# Patient Record
Sex: MALE | Race: WHITE | NOT HISPANIC OR LATINO
[De-identification: names, ages, dates, MRNs, and addresses within clinical notes are randomized per-mention and may not be internally consistent; named-entity substitution may affect disease eponyms.]

---

## 2020-05-07 ENCOUNTER — APPOINTMENT (OUTPATIENT)
Dept: FAMILY MEDICINE | Facility: CLINIC | Age: 40
End: 2020-05-07
Payer: MEDICAID

## 2020-05-07 DIAGNOSIS — F10.10 ALCOHOL ABUSE, UNCOMPLICATED: ICD-10-CM

## 2020-05-07 DIAGNOSIS — F19.11 OTHER PSYCHOACTIVE SUBSTANCE ABUSE, IN REMISSION: ICD-10-CM

## 2020-05-07 DIAGNOSIS — F10.21 ALCOHOL DEPENDENCE, IN REMISSION: ICD-10-CM

## 2020-05-07 DIAGNOSIS — Z02.82 ENCOUNTER FOR ADOPTION SERVICES: ICD-10-CM

## 2020-05-07 DIAGNOSIS — U07.1 COVID-19: ICD-10-CM

## 2020-05-07 DIAGNOSIS — M54.9 DORSALGIA, UNSPECIFIED: ICD-10-CM

## 2020-05-07 DIAGNOSIS — M54.12 RADICULOPATHY, CERVICAL REGION: ICD-10-CM

## 2020-05-07 PROCEDURE — 99204 OFFICE O/P NEW MOD 45 MIN: CPT | Mod: 95

## 2020-05-07 RX ORDER — TRAMADOL HYDROCHLORIDE 50 MG/1
50 TABLET, COATED ORAL
Qty: 10 | Refills: 0 | Status: ACTIVE | COMMUNITY
Start: 2020-05-07

## 2020-05-07 RX ORDER — CYCLOBENZAPRINE HYDROCHLORIDE 5 MG/1
5 TABLET, FILM COATED ORAL
Qty: 15 | Refills: 0 | Status: ACTIVE | COMMUNITY
Start: 2020-05-07

## 2020-05-07 RX ORDER — METHYLPHENIDATE HYDROCHLORIDE 5 MG/1
5 TABLET ORAL
Qty: 10 | Refills: 0 | Status: ACTIVE | COMMUNITY
Start: 2020-05-07

## 2020-05-07 RX ORDER — PROPRANOLOL HYDROCHLORIDE 10 MG/1
10 TABLET ORAL
Refills: 0 | Status: ACTIVE | COMMUNITY

## 2020-05-07 NOTE — PLAN
[FreeTextEntry1] : back pain- was in PT (injury to C4), not interested in surgery, referred to PMR \par \par heart palpitations- referred to cardio \par \par COVID- still intermittent SOB, will send albuterol inhaler prn (pt. to call back with pharmacy info) \par \par anxiety- referred to psych, was never diagnosed formerly, aware his fam hx if unknown but may involve mental health issues \par \par fu in 2 months for labs, sooner if needed\par \par \par \par

## 2020-05-07 NOTE — REVIEW OF SYSTEMS
[Shortness Of Breath] : shortness of breath [Dyspnea on Exertion] : dyspnea on exertion [Anxiety] : anxiety [Negative] : Heme/Lymph

## 2020-05-07 NOTE — HEALTH RISK ASSESSMENT
[1 or 2 (0 pts)] : 1 or 2 (0 points) [Never (0 pts)] : Never (0 points) [No] : In the past 12 months have you used drugs other than those required for medical reasons? No [No falls in past year] : Patient reported no falls in the past year [0] : 2) Feeling down, depressed, or hopeless: Not at all (0) [] : No [Audit-CScore] : 0 [de-identified] : hx of abuse  [de-identified] : running, weight lifting [[id number]] : 0

## 2020-05-07 NOTE — HISTORY OF PRESENT ILLNESS
[Other Location: e.g. Home (Enter Location, City,State)___] : at [unfilled] [Home] : at home, [unfilled] , at the time of the visit. [Self] : self [Patient] : the patient [FreeTextEntry8] : 40 yo m presents to discuss his COVID diagnosis, 7 weeks ago. \par Mentioned he has not had a primary for a while. Had difficulty finding one. \par Had COVID 19 in March-- was not hospitalzed. Affected lungs-- had sob, chest pressure, blurred vision, had trouble speaking. Lingering symptoms-- sob intermittently, trouble taking deep breath intermittently. Was seen at [facility]. \par Hx of anxiety made his symptoms worse. 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 \par Home remidies-- steam, tea, otc meds. \par Much better since he started. Starting to exercise again. Hour of heavy lifting and sprinting 1 mile for 10 min prior to this, starting to slowly start again. \par Loss 18 pounds over the past 7 weeks.

## 2020-05-19 ENCOUNTER — APPOINTMENT (OUTPATIENT)
Dept: HEART AND VASCULAR | Facility: CLINIC | Age: 40
End: 2020-05-19
Payer: MEDICAID

## 2020-05-19 PROCEDURE — 99204 OFFICE O/P NEW MOD 45 MIN: CPT | Mod: 95

## 2020-05-20 NOTE — DISCUSSION/SUMMARY
[FreeTextEntry1] : Palpitations will schedule for a seven day holter/ event monitor provided his insurance company feels that it is a reasonable course of action and deems appropriate to approve. Will schedule for echo to evaluate LV function and structure\par Dizziness unspecified, again, echo and holter will be helpful, will bring in for labs as well. \par Fatigue will do COVID antibody testing.

## 2020-05-20 NOTE — REASON FOR VISIT
[Initial Evaluation] : an initial evaluation of [Chest Pain] : chest pain [Dizziness] : dizziness [Palpitations] : palpitations [FreeTextEntry1] : This visit was provided via telehealth using real-time 2 way audio visual technology. The patient, DENNISE PRUITT, was located at home, [address]\[place] [address] , at the time of the vist. The Doctor, Tony Craft MD, [facility], was located at his medical office located at [address] [place] [address], [address] Floor, [place] [address] at the time of the visit. The patient, DENNISE PRUITT and the Doctor participated in telehealth encounter. Verbal consent was given on May 19, 2020  by the patient, self.\par \par 39 year old man evaluated secondary to palpitations and dizziness. Patient was seen at [facility] secondary to fatigue and palpitations. No COVID testing at the time, but was suggested to self isolate for a possible COVID infection. Since the, he seems to be making a recovery. Notes fatigue. This is often noted with activity. Symptoms always improve at rest. He notes palpitations as well. This is noted with activity at at rest. No syncopal events. However, he was dizzy at one point. We are discussing a cardiac work up for the above complains as one has not been done recently.\par \par

## 2020-05-20 NOTE — PHYSICAL EXAM
[General Appearance - Well Developed] : well developed [Well Groomed] : well groomed [Normal Appearance] : normal appearance [General Appearance - Well Nourished] : well nourished [No Deformities] : no deformities [General Appearance - In No Acute Distress] : no acute distress

## 2020-05-29 ENCOUNTER — APPOINTMENT (OUTPATIENT)
Dept: HEART AND VASCULAR | Facility: CLINIC | Age: 40
End: 2020-05-29
Payer: MEDICAID

## 2020-05-29 VITALS
WEIGHT: 173 LBS | HEIGHT: 72 IN | TEMPERATURE: 98.1 F | DIASTOLIC BLOOD PRESSURE: 61 MMHG | OXYGEN SATURATION: 96 % | SYSTOLIC BLOOD PRESSURE: 99 MMHG | BODY MASS INDEX: 23.43 KG/M2 | HEART RATE: 75 BPM

## 2020-05-29 PROCEDURE — 36415 COLL VENOUS BLD VENIPUNCTURE: CPT

## 2020-05-29 PROCEDURE — 93306 TTE W/DOPPLER COMPLETE: CPT

## 2020-05-30 LAB
25(OH)D3 SERPL-MCNC: 30 NG/ML
ALBUMIN SERPL ELPH-MCNC: 4.9 G/DL
ALP BLD-CCNC: 106 U/L
ALT SERPL-CCNC: 24 U/L
ANION GAP SERPL CALC-SCNC: 12 MMOL/L
AST SERPL-CCNC: 18 U/L
BASOPHILS # BLD AUTO: 0.05 K/UL
BASOPHILS NFR BLD AUTO: 0.8 %
BILIRUB SERPL-MCNC: 0.8 MG/DL
BUN SERPL-MCNC: 17 MG/DL
CALCIUM SERPL-MCNC: 9.6 MG/DL
CHLORIDE SERPL-SCNC: 104 MMOL/L
CHOLEST SERPL-MCNC: 100 MG/DL
CHOLEST/HDLC SERPL: 2.7 RATIO
CO2 SERPL-SCNC: 26 MMOL/L
CREAT SERPL-MCNC: 0.9 MG/DL
EOSINOPHIL # BLD AUTO: 0.23 K/UL
EOSINOPHIL NFR BLD AUTO: 3.6 %
ESTIMATED AVERAGE GLUCOSE: 103 MG/DL
GLUCOSE SERPL-MCNC: 90 MG/DL
HBA1C MFR BLD HPLC: 5.2 %
HCT VFR BLD CALC: 52.2 %
HDLC SERPL-MCNC: 37 MG/DL
HGB BLD-MCNC: 16.4 G/DL
IMM GRANULOCYTES NFR BLD AUTO: 0.3 %
LDLC SERPL CALC-MCNC: 52 MG/DL
LYMPHOCYTES # BLD AUTO: 1.56 K/UL
LYMPHOCYTES NFR BLD AUTO: 24.7 %
MAGNESIUM SERPL-MCNC: 2.1 MG/DL
MAN DIFF?: NORMAL
MCHC RBC-ENTMCNC: 29.8 PG
MCHC RBC-ENTMCNC: 31.4 GM/DL
MCV RBC AUTO: 94.9 FL
MONOCYTES # BLD AUTO: 0.44 K/UL
MONOCYTES NFR BLD AUTO: 7 %
NEUTROPHILS # BLD AUTO: 4.01 K/UL
NEUTROPHILS NFR BLD AUTO: 63.6 %
PLATELET # BLD AUTO: 166 K/UL
POTASSIUM SERPL-SCNC: 4.4 MMOL/L
PROT SERPL-MCNC: 6.6 G/DL
RBC # BLD: 5.5 M/UL
RBC # FLD: 12.1 %
SARS-COV-2 IGG SERPL IA-ACNC: 0.22 INDEX
SARS-COV-2 IGG SERPL QL IA: NEGATIVE
SODIUM SERPL-SCNC: 142 MMOL/L
T3FREE SERPL-MCNC: 3.71 PG/ML
T4 FREE SERPL-MCNC: 1.3 NG/DL
TRIGL SERPL-MCNC: 57 MG/DL
TSH SERPL-ACNC: 2.12 UIU/ML
VIT B12 SERPL-MCNC: 961 PG/ML
WBC # FLD AUTO: 6.31 K/UL

## 2020-06-01 ENCOUNTER — APPOINTMENT (OUTPATIENT)
Dept: HEART AND VASCULAR | Facility: CLINIC | Age: 40
End: 2020-06-01
Payer: MEDICAID

## 2020-06-01 PROCEDURE — 99214 OFFICE O/P EST MOD 30 MIN: CPT | Mod: 95

## 2020-06-01 NOTE — DISCUSSION/SUMMARY
[FreeTextEntry1] : SOB/palpitations DENNISE and I had a discussion of his symptoms. His risk for CAD falls intro intermediate. We will therefore move forward with a stress echo at this time to evaluate for obstructive CAD. Risks and benefits discussed.\par Fatigue holter pending

## 2020-06-01 NOTE — REASON FOR VISIT
[Follow-Up - Clinic] : a clinic follow-up of [Dizziness] : dizziness [Fatigue] : feeling tired (fatigue) [FreeTextEntry1] : This visit was provided via telehealth using real-time 2 way audio visual technology. The patient, DENNISE PRUITT, was located at home, [address]\[place] [address] , at the time of the vist. The Doctor, Tony Craft MD, [facility], was located at his medical office located at [address] [place] [address], [address] Floor, [place] [address] at the time of the visit. The patient, DENNISE PRUITT and the Doctor participated in telehealth encounter. Verbal consent was given on May 19, 2020  by the patient, self.\par \par 39 year old man evaluated secondary to palpitations and dizziness. Patient was seen at [facility] secondary to fatigue and palpitations. No COVID testing at the time, but was suggested to self isolate for a possible COVID infection. Since the, he seems to be making a recovery. Notes fatigue. This is often noted with activity. Symptoms always improve at rest. He notes palpitations as well. This is noted with activity at at rest. No syncopal events. However, he was dizzy at one point. We are discussing a cardiac work up as he completed an echo and labs while remains symptomatic. Holter is pending.

## 2020-06-01 NOTE — PHYSICAL EXAM
[General Appearance - Well Developed] : well developed [Normal Appearance] : normal appearance [Well Groomed] : well groomed [General Appearance - In No Acute Distress] : no acute distress [No Deformities] : no deformities [General Appearance - Well Nourished] : well nourished

## 2020-06-04 ENCOUNTER — APPOINTMENT (OUTPATIENT)
Dept: HEART AND VASCULAR | Facility: CLINIC | Age: 40
End: 2020-06-04
Payer: MEDICAID

## 2020-06-04 PROCEDURE — 93015 CV STRESS TEST SUPVJ I&R: CPT

## 2020-06-08 ENCOUNTER — APPOINTMENT (OUTPATIENT)
Dept: FAMILY MEDICINE | Facility: CLINIC | Age: 40
End: 2020-06-08
Payer: MEDICAID

## 2020-06-08 VITALS
WEIGHT: 172 LBS | SYSTOLIC BLOOD PRESSURE: 100 MMHG | HEART RATE: 79 BPM | DIASTOLIC BLOOD PRESSURE: 68 MMHG | HEIGHT: 72 IN | BODY MASS INDEX: 23.3 KG/M2 | TEMPERATURE: 98.2 F | OXYGEN SATURATION: 98 %

## 2020-06-08 DIAGNOSIS — F41.9 ANXIETY DISORDER, UNSPECIFIED: ICD-10-CM

## 2020-06-08 DIAGNOSIS — F90.9 ATTENTION-DEFICIT HYPERACTIVITY DISORDER, UNSPECIFIED TYPE: ICD-10-CM

## 2020-06-08 PROCEDURE — 99214 OFFICE O/P EST MOD 30 MIN: CPT | Mod: 25

## 2020-06-09 PROBLEM — F90.9 ADHD: Status: ACTIVE | Noted: 2020-05-12

## 2020-06-09 PROBLEM — F41.9 ANXIETY: Status: ACTIVE | Noted: 2020-05-07

## 2020-06-09 NOTE — HISTORY OF PRESENT ILLNESS
[FreeTextEntry8] : 38 yo m presents to discuss his sob only after extended activity. Mentioned his friend is a chinese medicine provider-- on herbal supplements. Mentioned has not used inhaler yet. Was COVID positive a approx 10-12 weeks ago. Feels much better than he was at that point, and mentioned he is able to do activity however still sob after extended activity (long runs, long walks). \par \par Interested in interpretation of his covid ab. Recent ab testing with cardio showed neg. \par \par Also interested in discussing adhd medication. Stated he no longer likes ritalin/ methylphenidate. Stated he was on and off of it since high school. Once he became sexually active he disliked the fact it made him impotent so he was able to engage in sex but had trouble with school and work. Interested in other options.

## 2020-06-18 ENCOUNTER — APPOINTMENT (OUTPATIENT)
Dept: HEART AND VASCULAR | Facility: CLINIC | Age: 40
End: 2020-06-18
Payer: MEDICAID

## 2020-06-18 ENCOUNTER — NON-APPOINTMENT (OUTPATIENT)
Age: 40
End: 2020-06-18

## 2020-06-18 VITALS
WEIGHT: 176 LBS | HEART RATE: 78 BPM | DIASTOLIC BLOOD PRESSURE: 66 MMHG | TEMPERATURE: 98.1 F | BODY MASS INDEX: 23.84 KG/M2 | HEIGHT: 72 IN | OXYGEN SATURATION: 97 % | SYSTOLIC BLOOD PRESSURE: 113 MMHG

## 2020-06-18 PROCEDURE — 99214 OFFICE O/P EST MOD 30 MIN: CPT

## 2020-06-18 NOTE — DISCUSSION/SUMMARY
[FreeTextEntry1] : Palps no restrictions Inclined towards a conservative follow up in this patient. We had a careful discussion regarding diet and exercise. Will be happy to re-evaluate.\par THORNE would recommend a Pulm evaluation for PFTs, also in-put if a sleep study would help might be of value.

## 2020-06-18 NOTE — PHYSICAL EXAM
[General Appearance - Well Developed] : well developed [Normal Appearance] : normal appearance [Well Groomed] : well groomed [General Appearance - Well Nourished] : well nourished [No Deformities] : no deformities [General Appearance - In No Acute Distress] : no acute distress [Eyelids - No Xanthelasma] : the eyelids demonstrated no xanthelasmas [Normal Conjunctiva] : the conjunctiva exhibited no abnormalities [No Oral Pallor] : no oral pallor [Normal Oral Mucosa] : normal oral mucosa [Normal Jugular Venous A Waves Present] : normal jugular venous A waves present [No Oral Cyanosis] : no oral cyanosis [Normal Jugular Venous V Waves Present] : normal jugular venous V waves present [No Jugular Venous Johnston A Waves] : no jugular venous johnston A waves [Respiration, Rhythm And Depth] : normal respiratory rhythm and effort [Auscultation Breath Sounds / Voice Sounds] : lungs were clear to auscultation bilaterally [Exaggerated Use Of Accessory Muscles For Inspiration] : no accessory muscle use [Heart Rate And Rhythm] : heart rate and rhythm were normal [Heart Sounds] : normal S1 and S2 [Murmurs] : no murmurs present [Abdomen Soft] : soft [Abdomen Tenderness] : non-tender [Abdomen Mass (___ Cm)] : no abdominal mass palpated [Abnormal Walk] : normal gait [Gait - Sufficient For Exercise Testing] : the gait was sufficient for exercise testing [Nail Clubbing] : no clubbing of the fingernails [Cyanosis, Localized] : no localized cyanosis [Petechial Hemorrhages (___cm)] : no petechial hemorrhages [Skin Color & Pigmentation] : normal skin color and pigmentation [] : no rash [No Venous Stasis] : no venous stasis [Skin Lesions] : no skin lesions [No Skin Ulcers] : no skin ulcer [No Xanthoma] : no  xanthoma was observed [Oriented To Time, Place, And Person] : oriented to person, place, and time [Affect] : the affect was normal [No Anxiety] : not feeling anxious [Mood] : the mood was normal

## 2020-06-18 NOTE — REASON FOR VISIT
[Follow-Up - Clinic] : a clinic follow-up of [Dyspnea] : dyspnea [FreeTextEntry1] : Presents for a follow up after the stress test to discuss symptoms as well as holter results. No chest discomfort noted. Occasional dyspnea and fatigue noted. He remarks on some palpitations with activity. His work up with me has been largely unremarkable. He did have a short run of non-sustained VT. However, it was only 5 beats and noted when he was asleep.

## 2020-07-06 ENCOUNTER — APPOINTMENT (OUTPATIENT)
Dept: PULMONOLOGY | Facility: CLINIC | Age: 40
End: 2020-07-06
Payer: MEDICAID

## 2020-07-06 VITALS
TEMPERATURE: 98.4 F | SYSTOLIC BLOOD PRESSURE: 125 MMHG | HEART RATE: 96 BPM | OXYGEN SATURATION: 96 % | BODY MASS INDEX: 23.84 KG/M2 | DIASTOLIC BLOOD PRESSURE: 70 MMHG | WEIGHT: 176 LBS | HEIGHT: 72 IN

## 2020-07-06 PROCEDURE — 99203 OFFICE O/P NEW LOW 30 MIN: CPT

## 2020-07-06 NOTE — PHYSICAL EXAM
[No Acute Distress] : no acute distress [No Neck Mass] : no neck mass [Normal Oropharynx] : normal oropharynx [Normal Appearance] : normal appearance [Normal Rate/Rhythm] : normal rate/rhythm [Normal S1, S2] : normal s1, s2 [No Murmurs] : no murmurs [No Resp Distress] : no resp distress [Benign] : benign [No Abnormalities] : no abnormalities [Clear to Auscultation Bilaterally] : clear to auscultation bilaterally [Normal Gait] : normal gait [No Cyanosis] : no cyanosis [No Edema] : no edema [No Clubbing] : no clubbing [FROM] : FROM [Normal Color/ Pigmentation] : normal color/ pigmentation [No Focal Deficits] : no focal deficits [Oriented x3] : oriented x3 [Normal Affect] : normal affect

## 2020-07-06 NOTE — DISCUSSION/SUMMARY
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 Referred by Dr. Craft. Mr. Hannah has dyspnea that is most likely multifactorial and is secondary to residual suspected COVID 19 infection and anxiety. His resting and ambulatory oxygenation are in acceptable ranges, but will get a CXR for completeness. Also asked him to keep a symptom diary so on follow up we can draw some conclusions as to when dyspneic episodes occur. Will also consider PFTs in the near future. I also encouraged him to start exercising. My suspicion is that most of his symptoms are driven by anxiety. We will proceed with home sleep apnea testing to exclude CAITLIN as a cause for his palpitations (and observed arrhythmias on Holter monitor). Support provided. Encouraged him to establish care with a therapist/psychiatrist. Follow up in 4 weeks after the HST (may follow up in August as he is moving).

## 2020-07-06 NOTE — HISTORY OF PRESENT ILLNESS
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 He had suspected COVID infection in March; could not get tested; IgG negative in May. Had a significant amount of difficulty with exercise tolerance and is now scared to start exercising again. He also has a lot of psychosocial aspects that are driving a lot of anxiety including past drug abuse, social anxiety, poorly controlled ADHD, and PTSD. He has a lot of arrhythmias with no clear etiology; work up negative to date. Does have nightmares at night and very vivid dreams. No bed displacement. History of night terrors and sleep walking as a child. Is not sure if he snores. Nightmares are of the same theme but not the same exact recurrent dream. Has stopped Ritalin use for over 1 year because was developing tolerance. Seeking a psychiatrist. Had a CXR in March and was told it was "normal."

## 2020-07-06 NOTE — CONSULT LETTER
[Please see my note below.] : Please see my note below. [Consult Letter:] : I had the pleasure of evaluating your patient, [unfilled]. [Dear  ___] : Dear  [unfilled], [Consult Closing:] : Thank you very much for allowing me to participate in the care of this patient.  If you have any questions, please do not hesitate to contact me. [Sincerely,] : Sincerely, [FreeTextEntry3] : Tressa Grimes MD\par \par [employer] [facility] [employer] [facility]\par Pulmonary, Critical Care, and Sleep Medicine\par

## 2020-07-14 ENCOUNTER — TRANSCRIPTION ENCOUNTER (OUTPATIENT)
Age: 40
End: 2020-07-14

## 2020-08-06 ENCOUNTER — APPOINTMENT (OUTPATIENT)
Dept: FAMILY MEDICINE | Facility: CLINIC | Age: 40
End: 2020-08-06

## 2020-08-19 ENCOUNTER — APPOINTMENT (OUTPATIENT)
Dept: FAMILY MEDICINE | Facility: CLINIC | Age: 40
End: 2020-08-19

## 2020-08-19 ENCOUNTER — OUTPATIENT (OUTPATIENT)
Dept: OUTPATIENT SERVICES | Facility: HOSPITAL | Age: 40
LOS: 1 days | End: 2020-08-19
Payer: COMMERCIAL

## 2020-08-19 ENCOUNTER — APPOINTMENT (OUTPATIENT)
Dept: RADIOLOGY | Facility: CLINIC | Age: 40
End: 2020-08-19

## 2020-08-19 PROCEDURE — 71046 X-RAY EXAM CHEST 2 VIEWS: CPT | Mod: 26

## 2020-08-20 LAB — SARS-COV-2 N GENE NPH QL NAA+PROBE: NOT DETECTED

## 2020-09-10 ENCOUNTER — APPOINTMENT (OUTPATIENT)
Dept: HEART AND VASCULAR | Facility: CLINIC | Age: 40
End: 2020-09-10
Payer: MEDICAID

## 2020-09-10 PROCEDURE — 99214 OFFICE O/P EST MOD 30 MIN: CPT | Mod: 95

## 2020-09-11 NOTE — DISCUSSION/SUMMARY
[FreeTextEntry1] : Dizziness/fatigue/palpitations we had an extensive discussion regarding symptoms and his work up so far. Would be inclined towards careful symptom monitor and o/p follow up

## 2020-09-11 NOTE — REASON FOR VISIT
[Follow-Up - Clinic] : a clinic follow-up of [Dyspnea] : dyspnea [Fatigue] : feeling tired (fatigue) [FreeTextEntry1] : This visit was provided via telehealth using real-time 2 way audio visual technology. The patient, DENNISE PRUITT, was located at home, [address]\[place] [address] , at the time of the vist. The Doctor, Tony Craft MD, [facility], was located at his medical office located at [address] [place] [address], [address] Floor, [place] [address] at the time of the visit. The patient, DENNISE PRUITT and the Doctor participated in telehealth encounter. Verbal consent was given on Sep 10, 2020  by the patient, self.\par \par Dennise seems to be doing well. He exercising regularly. His exercise tolerance seems to be improving and he feels stronger in terms of his endurance. We are discussing his work up today in the context of symptoms.

## 2020-09-11 NOTE — PHYSICAL EXAM
[General Appearance - Well Developed] : well developed [Normal Appearance] : normal appearance [General Appearance - Well Nourished] : well nourished [Well Groomed] : well groomed [No Deformities] : no deformities [General Appearance - In No Acute Distress] : no acute distress [Eyelids - No Xanthelasma] : the eyelids demonstrated no xanthelasmas [Normal Conjunctiva] : the conjunctiva exhibited no abnormalities [No Oral Pallor] : no oral pallor [Normal Oral Mucosa] : normal oral mucosa [No Oral Cyanosis] : no oral cyanosis [Normal Jugular Venous A Waves Present] : normal jugular venous A waves present [No Jugular Venous Johnston A Waves] : no jugular venous johnston A waves [Normal Jugular Venous V Waves Present] : normal jugular venous V waves present [Respiration, Rhythm And Depth] : normal respiratory rhythm and effort [Auscultation Breath Sounds / Voice Sounds] : lungs were clear to auscultation bilaterally [Exaggerated Use Of Accessory Muscles For Inspiration] : no accessory muscle use [Heart Rate And Rhythm] : heart rate and rhythm were normal [Heart Sounds] : normal S1 and S2 [Murmurs] : no murmurs present [Abdomen Tenderness] : non-tender [Abdomen Soft] : soft [Abdomen Mass (___ Cm)] : no abdominal mass palpated [Abnormal Walk] : normal gait [Gait - Sufficient For Exercise Testing] : the gait was sufficient for exercise testing [Cyanosis, Localized] : no localized cyanosis [Nail Clubbing] : no clubbing of the fingernails [Petechial Hemorrhages (___cm)] : no petechial hemorrhages [Skin Color & Pigmentation] : normal skin color and pigmentation [No Venous Stasis] : no venous stasis [] : no rash [Skin Lesions] : no skin lesions [No Skin Ulcers] : no skin ulcer [Oriented To Time, Place, And Person] : oriented to person, place, and time [No Xanthoma] : no  xanthoma was observed [Mood] : the mood was normal [Affect] : the affect was normal [No Anxiety] : not feeling anxious

## 2020-09-16 ENCOUNTER — EMERGENCY (EMERGENCY)
Facility: HOSPITAL | Age: 40
LOS: 1 days | Discharge: HOME | End: 2020-09-16
Attending: EMERGENCY MEDICINE | Admitting: EMERGENCY MEDICINE
Payer: MEDICAID

## 2020-09-16 VITALS
DIASTOLIC BLOOD PRESSURE: 70 MMHG | RESPIRATION RATE: 17 BRPM | SYSTOLIC BLOOD PRESSURE: 140 MMHG | TEMPERATURE: 98 F | HEIGHT: 75 IN | WEIGHT: 199.96 LBS | HEART RATE: 77 BPM | OXYGEN SATURATION: 99 %

## 2020-09-16 DIAGNOSIS — Z20.828 CONTACT WITH AND (SUSPECTED) EXPOSURE TO OTHER VIRAL COMMUNICABLE DISEASES: ICD-10-CM

## 2020-09-16 PROCEDURE — 99283 EMERGENCY DEPT VISIT LOW MDM: CPT

## 2020-09-16 NOTE — ED ADULT TRIAGE NOTE - CHIEF COMPLAINT QUOTE
PT requesting COVID testing. Denies symptoms or exposure or travel. Needs negative testing for work.

## 2020-09-16 NOTE — ED PROVIDER NOTE - OBJECTIVE STATEMENT
40 y/o male presenting to the ED requesting COVID-19 screening for work clearance. Patient is currently asymptomatic and has no other medical complaints at this time. Denies recent international travel or known exposure to COVID-19. Denies fever, chills, chest pain, SOB.

## 2020-09-16 NOTE — ED PROVIDER NOTE - NSFOLLOWUPINSTRUCTIONS_ED_ALL_ED_FT
COVID-19: SLOW THE CORONAVIRUS SPREAD - General Information           COVID-19: Slow the Coronavirus Spread    WHAT YOU NEED TO KNOW:    Why is it important to slow the spread of the 2019 coronavirus? The virus that causes coronavirus disease 2019 (COVID-19) is highly contagious (easily spread). COVID-19 can cause severe or life-threatening health problems in some people. Signs and symptoms of infection can take up to 14 days to begin, or may not develop at all. This means you or someone around you may have the virus and pass it to others without knowing it. No vaccine is available yet for the new coronavirus. You can help slow the spread of the virus by following worldwide and local guidelines for infection prevention.    Limit the Spread of Infectious Disease         How does the 2019 coronavirus spread? The virus spreads quickly and easily. You can become infected if you are in contact with a large amount of the virus, even for a short time. You can also become infected by being around a small amount of virus for a long time. The following are ways the virus is thought to spread, but more information may be coming:   •Droplets are the most common way all coronaviruses spread. The virus can travel in droplets that form when a person talks, coughs, or sneezes. Anyone who breathes in the droplets or gets them in his or her eyes can become infected with the virus.      •Person-to-person contact can spread the virus. For example, a person with the virus on his or her hands can spread it by shaking hands with someone. At this time, it does not appear that the virus can be passed to a baby during pregnancy or delivery. The baby can be infected after he or she is born through person-to-person contact. The virus also does not appear to spread in breast milk. If you are pregnant or breastfeeding, talk to your healthcare provider or obstetrician about any concerns you have.      •The virus can stay on objects and surfaces. A person can get the virus on his or her hands by touching the object or surface. Infection happens if the person then touches his or her eyes or mouth with unwashed hands. It is not yet known how long the virus can stay on an object or surface. That is why it is important to clean all surfaces that are used regularly.      •An infected animal may be able to infect a person who touches it. This may happen at live markets or on a farm.      How will washing my hands help prevent the virus from spreading? When you use soap and water, you kill and remove the virus from your hands. Wash your hands often throughout the day. Rub your soapy hands together, lacing your fingers. Wash the front and back of each hand, and in between your fingers. Use the fingers of one hand to scrub under the fingernails of the other hand. Wash for at least 20 seconds. Rinse with warm, running water for several seconds. Then dry your hands with a clean towel or paper towel. Use hand  that contains alcohol if soap and water are not available. Do not touch your eyes, nose, or mouth without washing your hands first. Teach children how to wash their hands.    Handwashing         How will covering sneezes and coughs help prevent the virus from spreading? You prevent droplets from traveling from you to others by covering sneezes and coughs. Turn your face away and cover your mouth and nose with a tissue. Throw the tissue away. Use the bend of your arm if a tissue is not available. Then wash your hands well with soap and water or use a hand . Turn your head and cover your face when you are around someone who is sneezing or coughing. Teach children how to cover a cough or sneeze. Remind them to wash their hands.    How will social distancing help prevent the virus from spreading? The best way to prevent infection is to avoid anyone who is infected, but this can be hard to do. An infected person can spread the virus before signs or symptoms begin, or even if signs or symptoms never develop. Social distancing means people stay far enough apart physically that the virus cannot spread from one person to another. National and local social distancing rules vary. Rules may change over time as restrictions are lifted, but they may return if an outbreak happens where you live. It is important to know and follow all current social distancing rules in your area. The following are general guidelines:   •Limit trips out of your home. Most local guidelines allow leaving the home to buy food or supplies, or to seek medical care. You may be able to have food, medicines, and other supplies delivered. If possible, have delivered items left at your door or other area. Try not to have someone hand you an item. You will be so close to the person that the virus can spread between you.      •Stay at least 6 feet (2 meters) away from anyone who does not live in your home. Do not shake hands with, hug, or kiss a person as a greeting. Stand or walk as far from others as possible, especially around anyone who is sneezing or coughing. If you must use public transportation (such as a bus, subway, or ride share), try to sit or stand away from others. You can stay safely connected with others through phone calls, e-mail messages, social media websites, and video chats. Check in on anyone who may be having a hard time socially distancing, or who lives alone. Ask administrators at nursing homes or long-term care facilities how you can safely communicate with someone living there.      •Wear a cloth face covering (mask) when you must go out. A face covering helps prevent the virus from spreading in droplets. You can make a face covering out of a thick cloth. This will save medical masks for healthcare workers and first responders as they test for and treat COVID-19. Choose fabric that holds its shape after it is washed and dried. Examples are a bandana, a cotton shirt, or a towel. Check that you can breathe through the fabric when it is folded into several layers. Put the top half of a paper coffee filter in the middle of the fabric to create an extra filter for you. Fold the fabric into a long band. Put each end through a rubber band or hair tie to create ear loops. Fold the ends of the fabric toward the middle. Hold the covering to your face. Adjust it so it covers your nose and mouth completely. Hook the loops onto your ears to keep the covering in place. The following are important points to remember:?Do not use a plastic face shield instead of a cloth covering. A face shield will not protect others from droplets. If a face shield must be used, choose one that wraps around both sides of the face and goes below the chin. Do not use disposable shields more than 1 time. Shields that can be used again need to be cleaned and disinfected between uses. Do not put a face shield or a cloth covering on a  or infant. These increase the risk for sudden infant death syndrome (SIDS).      ?Continue social distancing while you are out. A face covering does not mean you can have close physical contact with others. You still need to stay at least 6 feet (2 meters) away from others.      ?Do not touch your face covering or eyes while you are wearing the covering. Your eyes will not be covered by the cloth. You can be infected if the virus is on your hand and you touch your eyes. Wash your hands with soap and water for 20 seconds or use hand  before you remove your face covering.      ?Do not remove your face covering to talk, sneeze, or cough. The covering will help protect you and others around you.      ?Wash face coverings often. Wash them in the warmest water the fabric allows. Make sure the fabric is completely dry before you use the face covering again. Only wear clean coverings when you go out. Use a new coffee filter each time.      ?Certain individuals should not use face coverings. Do not put a face covering on anyone who is younger than 2 years. Young children may not be able to breathe through the covering. Do not put a face covering on anyone who has breathing problems. The covering may worsen breathing problems. Do not put a face covering on anyone who cannot remove it by himself or herself.      ?You can use a clear face covering if others need to read your lips. A clear covering may be helpful if you communicate with someone who is deaf or hard of hearing. A clear covering is made of cloth but has plastic over the mouth area. This will help the person see your lips more easily. Do not use a plastic face shield instead.      •Do not have anyone over to your home unless it is necessary. It is okay to have medical workers in to provide care. Wear your face covering, and remind medical workers to wear a mask or face covering. Remind them to wash their hands when they arrive and before they leave. Do not have family members, friends, or neighbors over, even if they are not sick. Remember that a person can pass the new virus to others before symptoms of COVID-19 begin. Some people never even develop symptoms. Children commonly have mild symptoms or no symptoms. It is important that you continue social distancing with everyone, including children. It may be hard to tell a child not to hug or kiss you. Explain that this is how he or she can help you stay healthy.      •Do not go to someone else's home unless it is necessary. Do not go over to visit, even if the person is lonely. Only go if you need to help him or her.      •Avoid large gatherings and crowds. Gatherings or crowds of 10 or more individuals can cause the virus to spread. Examples of gatherings include parties, sporting events, Rastafari services, and conferences. Crowds may form at beaches, weiner, and tourist attractions. You can continue to protect yourself by staying away from large gatherings and crowds.      •Ask your healthcare provider for other ways to have appointments. You may be able to have appointments without having to go into the provider's office. Some providers offer phone, video, or other types of appointments. You may also be able to get prescriptions for a few months of your medicines at a time.      •Stay safe if you must go out to work. You may have a job only done outside your home that is considered essential. Examples include healthcare workers, firefighters, police officers, and utility workers. Keep physical distance between you and other workers as much as possible. Follow your employer's rules so everyone stays safe.      What can I do to prevent an infection in my home?   •Wash your hands often. Make it a habit to wash your hands throughout the day. Wash before and after you care for anyone who thinks or knows he or she has COVID-19. Use soap and water. Remember to wash for at least 20 seconds. You can use a hand  that contains alcohol if soap and water are not available.      •Clean and disinfect your home often. Do this even if you think no one living in or coming to your home is infected with the virus. A person can spread the virus before symptoms begin, or even if no symptoms develop. Clean and disinfect to kill and remove the virus from high-touch surfaces and items. This prevents anyone from getting the virus on his or her hands and becoming infected or spreading the virus to others. The following are general cleaning guidelines:?Wear disposable gloves while you are cleaning. Do not touch surfaces or items with your bare hands.      ?Use disinfecting wipes or a disinfecting solution. You can make a solution by mixing 1 part bleach with 10 parts water. Clean with a sponge or cloth that can be thrown away or washed in hot, soapy water and reused.      ?Be careful with . Open windows or have circulating air as you clean. Do not mix ammonia with bleach. This will create toxic fumes. Read the labels of all products you use.      ?Clean and disinfect surfaces throughout your home. Surfaces include countertops, cupboard doors, desks, handles, handrails, doorknobs, toilet handles, faucets, chairs, and light switches.      ?Clean and disinfect items well. Objects include keys, phones, computer keyboards and mice, video games, remote controls, and children's toys.      ?Clean used dishes and utensils well. Use hot, soapy water or a .      ?Wash clothing and bedding well. You can use regular laundry detergent. Follow instructions on the clothing or bedding label. Wash and dry on the warmest settings for the fabric.      •Do not share items with anyone. This includes food, drinks, dishes, and eating utensils.      •Prepare surfaces any time you are going to bring something into your home. Find space you can use to place items you bring in. Find space you can clean and disinfect well, such as a kitchen countertop.      •Safely handle packages delivered to your home. It is not known for sure how long the virus can stay on cardboard or other packaging. You may want to wipe packages with sanitizing wipes. Open the package. Wash your hands. Then move the contents of the package onto a clean surface. Throw the packaging away outside your home. Then wash your hands for at least 20 seconds with soap and water. Clean the surface you laid the package on when you brought it in. Remember to clean and disinfect anything else you touched, such as doorknobs or faucet handles.      •Safely handle food you have delivered or  from a restaurant. If possible, have food left at your door or placed into your car. This helps prevent close physical contact with anyone. Open the delivery containers and put the contents into clean dishes or containers. Throw the delivery containers away outside your home. Wash your hands.      •Keep anyone who is infected away from others in the home. A person who has COVID-19 needs to stay at home until healthcare providers say it is okay to be around others. This is important, but it can also lead to others in the home becoming infected. If possible, keep the infected person isolated (away from others in the home). The person should have his or her own dishes and eating utensils. Items the person uses need to be cleaned separately. Anyone who touches the person's items, clothing, or bedding needs to wear gloves that can be thrown away after use. It is important for the person to feel connected with others. Isolation can be lonely. The person may feel anxious or depressed. He or she can safely stay connected through phone calls, video chats, social media, and e-mail messages.      How can I stay safe if I must go out of my home? If you must go out, follow these and any local recommendations to prevent infection:   •Before you go out: ?Remember to wash your hands. Wash before you leave your home, so you do not bring the virus with you. Wash again when you get home, after you put away any items you bought.      ?Bring disinfecting wipes or hand  with you. Hold a wipe or tissue as you open any doors. Use hand  after you touch elevator buttons or other commonly used surfaces or items. Apply hand  or use a wipe for your hands before you use the keys to unlock or start the car.      ?Make a list of items to buy before you go out. This will limit the items you touch in the store. The virus may live on surfaces and objects for up to several days. The more items you handle, the more risk you take of getting the virus on your hands.      ?Prepare surfaces for when you return. Find space you can use to place items you bought. Find space you can clean and disinfect well, such as a kitchen countertop.      •While you are out: ?Wear your cloth face covering. Do not touch the covering or your eyes while you are out.      ?Use disinfecting wipes to clean items you need to use to shop. Clean shopping cart handles. Clean the area a toddler will sit on or where you will put a baby carrier. Wipe a cart made for children to drive in the store before your toddler gets into it. Wipe the seat, steering wheel, and any part your child must touch.      ?If possible, use a debit or credit card to pay. The virus may be able to stay on paper money. You can become infected when you touch the money.      •When you get home: ?Carefully take the face covering off and wash your hands. Put used coverings together. If possible, keep them in a closed laundry bag or basket until you can wash them.      ?Unpack your items safely. Wipe or wash your hands before you open packaging. Put your items on the clean surface you prepared. You can use a disinfecting wipe to clean items before you put them away. Wash fruits and vegetables before you put them away. If possible, scrub the skins with a vegetable brush.      ?Clean countertops or other surfaces any shopping bags touched. When you are finished putting your items away, wash your hands. Use disinfecting wipes or a solution to clean surfaces. You can also wipe the vehicle you drove. Wipe the area the bags had been. Wipe anything you touched inside the car. Examples include the steering wheel, seatbelt, inner and outer door handles, turn signals, and radio or other buttons. After items are put away and areas cleaned, remember to wash your hands.        When should I call my doctor?   •You have questions about social distancing or ways to keep your home and family safe.      •You have questions or concerns about the new coronavirus or COVID-19.      Where can I find more information?   •Centers for Disease Control and Prevention  [address], [place]  Phone: 2-015-0564563  Phone: 3-338-6400757  Web Address: http://www.cdc.gov        CARE AGREEMENT:    You have the right to help plan your care. Learn about your health condition and how it may be treated. Discuss treatment options with your healthcare providers to decide what care you want to receive. You always have the right to refuse treatment.        © Copyright [employer] 2020           back to top                          © Copyright [employer] 2020

## 2020-09-16 NOTE — ED PROVIDER NOTE - PATIENT PORTAL LINK FT
You can access the FollowMyHealth Patient Portal offered by [facility] by registering at the following website: [email or web address]. By joining [facility]’s FollowMyHealth portal, you will also be able to view your health information using other applications (apps) compatible with our system.

## 2020-09-16 NOTE — ED PROVIDER NOTE - CLINICAL SUMMARY MEDICAL DECISION MAKING FREE TEXT BOX
Patient requesting testing for COVID-19. On exam, Patient appears well and in no apparent distress. Currently asymptomatic. Will order testing for COVID-19 and discharge afterwards. Patient agrees to receiving results electronically.

## 2020-09-16 NOTE — ED PROVIDER NOTE - RESPIRATORY, MLM
No signs of conversational dyspnea, tachypnea, or signs of respiratory distress. Speaking full sentences.

## 2020-09-17 LAB — SARS-COV-2 RNA SPEC QL NAA+PROBE: SIGNIFICANT CHANGE UP

## 2020-09-21 ENCOUNTER — LABORATORY RESULT (OUTPATIENT)
Age: 40
End: 2020-09-21

## 2020-09-21 ENCOUNTER — APPOINTMENT (OUTPATIENT)
Dept: FAMILY MEDICINE | Facility: CLINIC | Age: 40
End: 2020-09-21
Payer: MEDICAID

## 2020-09-21 DIAGNOSIS — Z11.59 ENCOUNTER FOR SCREENING FOR OTHER VIRAL DISEASES: ICD-10-CM

## 2020-09-21 PROCEDURE — 99213 OFFICE O/P EST LOW 20 MIN: CPT | Mod: 95

## 2020-09-21 NOTE — HISTORY OF PRESENT ILLNESS
[Home] : at home, [unfilled] , at the time of the visit. [Medical Office: ([facility])___] : at the medical office located in  [Verbal consent obtained from patient] : the patient, [unfilled] [FreeTextEntry8] : cc: COVID testing \par \par 38 yo m presents to discuss recent COVID testing. \par Pt. needed testing for work. \par Mentioned he was recently negative, was positive in the past. \par No symptoms aside from mild moments of SOB, has not tried inhalers yet or picked up inhaler from pulm. \par COVID test pending for pulm study and sleep study. \par No other concerns today.

## 2020-09-22 ENCOUNTER — TRANSCRIPTION ENCOUNTER (OUTPATIENT)
Age: 40
End: 2020-09-22

## 2020-09-24 ENCOUNTER — APPOINTMENT (OUTPATIENT)
Dept: PULMONOLOGY | Facility: CLINIC | Age: 40
End: 2020-09-24

## 2020-09-25 ENCOUNTER — APPOINTMENT (OUTPATIENT)
Dept: PULMONOLOGY | Facility: CLINIC | Age: 40
End: 2020-09-25
Payer: MEDICAID

## 2020-09-25 DIAGNOSIS — K21.9 GASTRO-ESOPHAGEAL REFLUX DISEASE W/OUT ESOPHAGITIS: ICD-10-CM

## 2020-09-25 PROCEDURE — 94618 PULMONARY STRESS TESTING: CPT

## 2020-09-25 PROCEDURE — 94729 DIFFUSING CAPACITY: CPT

## 2020-09-25 PROCEDURE — 94010 BREATHING CAPACITY TEST: CPT

## 2020-09-25 PROCEDURE — 94726 PLETHYSMOGRAPHY LUNG VOLUMES: CPT

## 2020-09-25 RX ORDER — OMEPRAZOLE 20 MG/1
20 TABLET, DELAYED RELEASE ORAL
Qty: 14 | Refills: 0 | Status: ACTIVE | COMMUNITY
Start: 2020-09-25 | End: 1900-01-01

## 2020-10-05 ENCOUNTER — APPOINTMENT (OUTPATIENT)
Dept: PULMONOLOGY | Facility: CLINIC | Age: 40
End: 2020-10-05

## 2020-10-07 ENCOUNTER — APPOINTMENT (OUTPATIENT)
Dept: PULMONOLOGY | Facility: CLINIC | Age: 40
End: 2020-10-07
Payer: MEDICAID

## 2020-10-07 PROCEDURE — 99214 OFFICE O/P EST MOD 30 MIN: CPT | Mod: 95

## 2020-10-07 NOTE — PROCEDURE
[FreeTextEntry1] : PFTs from 9/25/2020\par No prior for comparison\par \par PFT\par FEV1 88\par FEV1/FVC 8\par TLC 81\par RV 40\par DLCO 93\par *normal spirometry, lung volumes, diffusion\par \par 6MWT from 9/25/2020\par pre/post HR 61/85\par pre/post saturation 99/97% \par pre/post [place] Dyspnea 2\par meters walked 305\par *no desaturation, reduced walk capacity\par \par CXR reviewed: no obvious pleural or parenchymal abnormalities. \par Radiologist noted hyperinflation but no diaphragmatic flattening seen, most likely good inspiratory effort in young adult.

## 2020-10-07 NOTE — PHYSICAL EXAM
[No Acute Distress] : no acute distress [Normal Appearance] : normal appearance [Well Groomed] : well groomed [TextBox_2] : *****full physical exam could not be performed due to this being a TeleHealth visit

## 2020-10-07 NOTE — HISTORY OF PRESENT ILLNESS
[Home] : at home, [unfilled] , at the time of the visit. [Medical Office: ([facility])___] : at the medical office located in  [Former] : former [TextBox_4] : 38 yo with hx of IBS, anxiety, possible autism, hx of IVDA s/p rehab (now more than 10 yrs "clean") referred for evaluation of dyspnea and palpitations. He had suspected COVID infection in March; could not get tested; IgG negative in May. Had a significant amount of difficulty with exercise tolerance and is now scared to start exercising again. He also has a lot of psychosocial aspects that are driving a lot of anxiety including past drug abuse, social anxiety, poorly controlled ADHD, and PTSD. He has a lot of arrhythmias with no clear etiology; work up negative to date. Does have nightmares at night and very vivid dreams. No bed displacement. History of night terrors and sleep walking as a child. Is not sure if he snores. Nightmares are of the same theme but not the same exact recurrent dream. Has stopped Ritalin use for over 1 year because was developing tolerance. Seeking a psychiatrist. Had a CXR in March and was told it was "normal."\par \par 10/7/2020\par Have been communicating via [facility]/phone. Was started on ICS given persistent symptoms suggestive of mild asthma. Also had a few symptoms suggestive of VCD. Qvar is working but not completely. Has needed to use a few breakthrough doses of ICS. Used ROSALINDA and felt "off" for a few hours. Former smoker 3 packs per day x 1 year, 1 pack per day x 10 years. Has newly discovered bed bugs and mold in his current apartment. [TextBox_11] : 3 [Awakes Unrefreshed] : awakes unrefreshed [Nonrestorative Sleep] : nonrestorative sleep

## 2020-10-07 NOTE — ASSESSMENT
[FreeTextEntry1] : 40 yo with hx of IBS, anxiety, possible autism, hx of IVDA s/p rehab (now more than 10 yrs "clean") referred for evaluation of dyspnea and palpitations. Had suspected COVID in March; IgG negative in May. PFTs without spirometric, volume or diffusion abnormalities. Six minute walk test without desaturation. Started on ICS for mild persistent asthma and is perceiving benefit but not complete. Will increase to 2 puffs BID especially  now that there is an increase in environmental triggers (mold, bed bugs?, cockroaches). Advised on rinsing mouth after each use. Still pending HST. Will follow up in 4 weeks.

## 2020-10-21 ENCOUNTER — NON-APPOINTMENT (OUTPATIENT)
Age: 40
End: 2020-10-21

## 2020-10-28 ENCOUNTER — OUTPATIENT (OUTPATIENT)
Dept: OUTPATIENT SERVICES | Facility: HOSPITAL | Age: 40
LOS: 1 days | End: 2020-10-28
Payer: MEDICAID

## 2020-10-28 ENCOUNTER — APPOINTMENT (OUTPATIENT)
Dept: SLEEP CENTER | Facility: HOME HEALTH | Age: 40
End: 2020-10-28
Payer: MEDICAID

## 2020-10-28 DIAGNOSIS — G47.33 OBSTRUCTIVE SLEEP APNEA (ADULT) (PEDIATRIC): ICD-10-CM

## 2020-10-28 PROCEDURE — 95800 SLP STDY UNATTENDED: CPT | Mod: 26

## 2020-10-28 PROCEDURE — 95800 SLP STDY UNATTENDED: CPT

## 2020-11-11 ENCOUNTER — NON-APPOINTMENT (OUTPATIENT)
Age: 40
End: 2020-11-11

## 2020-11-12 ENCOUNTER — TRANSCRIPTION ENCOUNTER (OUTPATIENT)
Age: 40
End: 2020-11-12

## 2020-11-16 ENCOUNTER — TRANSCRIPTION ENCOUNTER (OUTPATIENT)
Age: 40
End: 2020-11-16

## 2020-11-16 ENCOUNTER — NON-APPOINTMENT (OUTPATIENT)
Age: 40
End: 2020-11-16

## 2020-11-16 LAB — SARS-COV-2 N GENE NPH QL NAA+PROBE: NOT DETECTED

## 2020-11-20 ENCOUNTER — NON-APPOINTMENT (OUTPATIENT)
Age: 40
End: 2020-11-20

## 2020-11-25 ENCOUNTER — APPOINTMENT (OUTPATIENT)
Dept: PULMONOLOGY | Facility: CLINIC | Age: 40
End: 2020-11-25
Payer: MEDICAID

## 2020-11-25 PROCEDURE — 99214 OFFICE O/P EST MOD 30 MIN: CPT | Mod: 95

## 2020-11-25 NOTE — ASSESSMENT
[FreeTextEntry1] : 38 yo with hx of IBS, anxiety, possible autism, hx of IVDA s/p rehab (now more than 10 yrs "clean") with mild persistent asthma. Sleep study was pursued because of intermittent perceived apneas, and he also grinds his teeth. HST did not show any significant sleep apnea. Home testing can miss mild CAITLIN but we have decided to defer further attended testing. C/w ICS. Mr. Hannah will follow up in 2 to 3 months or sooner if needed.

## 2020-11-25 NOTE — HISTORY OF PRESENT ILLNESS
[Medical Office: ([facility])___] : at the medical office located in  [Verbal consent obtained from patient] : the patient, [unfilled] [Former] : former [Awakes Unrefreshed] : awakes unrefreshed [Nonrestorative Sleep] : nonrestorative sleep [TextBox_4] : 38 yo with hx of IBS, anxiety, possible autism, hx of IVDA s/p rehab (now more than 10 yrs "clean") referred for evaluation of dyspnea and palpitations. He had suspected COVID infection in March; could not get tested; IgG negative in May. Had a significant amount of difficulty with exercise tolerance and is now scared to start exercising again. He also has a lot of psychosocial aspects that are driving a lot of anxiety including past drug abuse, social anxiety, poorly controlled ADHD, and PTSD. He has a lot of arrhythmias with no clear etiology; work up negative to date. Does have nightmares at night and very vivid dreams. No bed displacement. History of night terrors and sleep walking as a child. Is not sure if he snores. Nightmares are of the same theme but not the same exact recurrent dream. Has stopped Ritalin use for over 1 year because was developing tolerance. Seeking a psychiatrist. Had a CXR in March and was told it was "normal."\par \par 10/7/2020\par Have been communicating via [facility]/phone. Was started on ICS given persistent symptoms suggestive of mild asthma. Also had a few symptoms suggestive of VCD. Qvar is working but not completely. Has needed to use a few breakthrough doses of ICS. Used ROSALINDA and felt "off" for a few hours. Former smoker 3 packs per day x 1 year, 1 pack per day x 10 years. Has newly discovered bed bugs and mold in his current apartment.\par \par 11/25/2020\par Has been doing okay. Using Qvar and is perceiving benefit. Had HST and would like to discuss results. See prior [facility] notes. [Home] : home [TextBox_100] : 10/2020 [TextBox_108] : 1.5 [TextBox_116] : 88

## 2020-12-01 ENCOUNTER — EMERGENCY (EMERGENCY)
Facility: HOSPITAL | Age: 40
LOS: 1 days | Discharge: HOME | End: 2020-12-01
Admitting: EMERGENCY MEDICINE
Payer: COMMERCIAL

## 2020-12-01 VITALS
DIASTOLIC BLOOD PRESSURE: 66 MMHG | RESPIRATION RATE: 16 BRPM | SYSTOLIC BLOOD PRESSURE: 118 MMHG | OXYGEN SATURATION: 96 % | HEIGHT: 75 IN | WEIGHT: 181 LBS | HEART RATE: 87 BPM | TEMPERATURE: 98 F

## 2020-12-01 DIAGNOSIS — L02.213 CUTANEOUS ABSCESS OF CHEST WALL: ICD-10-CM

## 2020-12-01 LAB
ALBUMIN SERPL ELPH-MCNC: 4.1 G/DL — SIGNIFICANT CHANGE UP (ref 3.4–5)
ALP SERPL-CCNC: 122 U/L — HIGH (ref 40–120)
ALT FLD-CCNC: 25 U/L — SIGNIFICANT CHANGE UP (ref 12–42)
ANION GAP SERPL CALC-SCNC: 7 MMOL/L — LOW (ref 9–16)
AST SERPL-CCNC: 27 U/L — SIGNIFICANT CHANGE UP (ref 15–37)
BASOPHILS # BLD AUTO: 0.05 K/UL — SIGNIFICANT CHANGE UP (ref 0–0.2)
BASOPHILS NFR BLD AUTO: 0.6 % — SIGNIFICANT CHANGE UP (ref 0–2)
BILIRUB SERPL-MCNC: 0.6 MG/DL — SIGNIFICANT CHANGE UP (ref 0.2–1.2)
BUN SERPL-MCNC: 16 MG/DL — SIGNIFICANT CHANGE UP (ref 7–23)
CALCIUM SERPL-MCNC: 9.3 MG/DL — SIGNIFICANT CHANGE UP (ref 8.5–10.5)
CHLORIDE SERPL-SCNC: 103 MMOL/L — SIGNIFICANT CHANGE UP (ref 96–108)
CO2 SERPL-SCNC: 31 MMOL/L — SIGNIFICANT CHANGE UP (ref 22–31)
CREAT SERPL-MCNC: 0.98 MG/DL — SIGNIFICANT CHANGE UP (ref 0.5–1.3)
EOSINOPHIL # BLD AUTO: 0.16 K/UL — SIGNIFICANT CHANGE UP (ref 0–0.5)
EOSINOPHIL NFR BLD AUTO: 1.9 % — SIGNIFICANT CHANGE UP (ref 0–6)
GLUCOSE SERPL-MCNC: 86 MG/DL — SIGNIFICANT CHANGE UP (ref 70–99)
HCT VFR BLD CALC: 47.9 % — SIGNIFICANT CHANGE UP (ref 39–50)
HGB BLD-MCNC: 16.7 G/DL — SIGNIFICANT CHANGE UP (ref 13–17)
IMM GRANULOCYTES NFR BLD AUTO: 0.5 % — SIGNIFICANT CHANGE UP (ref 0–1.5)
LYMPHOCYTES # BLD AUTO: 1.1 K/UL — SIGNIFICANT CHANGE UP (ref 1–3.3)
LYMPHOCYTES # BLD AUTO: 13.2 % — SIGNIFICANT CHANGE UP (ref 13–44)
MCHC RBC-ENTMCNC: 30.7 PG — SIGNIFICANT CHANGE UP (ref 27–34)
MCHC RBC-ENTMCNC: 34.9 GM/DL — SIGNIFICANT CHANGE UP (ref 32–36)
MCV RBC AUTO: 88.1 FL — SIGNIFICANT CHANGE UP (ref 80–100)
MONOCYTES # BLD AUTO: 0.66 K/UL — SIGNIFICANT CHANGE UP (ref 0–0.9)
MONOCYTES NFR BLD AUTO: 7.9 % — SIGNIFICANT CHANGE UP (ref 2–14)
NEUTROPHILS # BLD AUTO: 6.34 K/UL — SIGNIFICANT CHANGE UP (ref 1.8–7.4)
NEUTROPHILS NFR BLD AUTO: 75.9 % — SIGNIFICANT CHANGE UP (ref 43–77)
NRBC # BLD: 0 /100 WBCS — SIGNIFICANT CHANGE UP (ref 0–0)
PLATELET # BLD AUTO: 174 K/UL — SIGNIFICANT CHANGE UP (ref 150–400)
POTASSIUM SERPL-MCNC: 4.4 MMOL/L — SIGNIFICANT CHANGE UP (ref 3.5–5.3)
POTASSIUM SERPL-SCNC: 4.4 MMOL/L — SIGNIFICANT CHANGE UP (ref 3.5–5.3)
PROT SERPL-MCNC: 7.4 G/DL — SIGNIFICANT CHANGE UP (ref 6.4–8.2)
RBC # BLD: 5.44 M/UL — SIGNIFICANT CHANGE UP (ref 4.2–5.8)
RBC # FLD: 11.5 % — SIGNIFICANT CHANGE UP (ref 10.3–14.5)
SODIUM SERPL-SCNC: 141 MMOL/L — SIGNIFICANT CHANGE UP (ref 132–145)
WBC # BLD: 8.35 K/UL — SIGNIFICANT CHANGE UP (ref 3.8–10.5)
WBC # FLD AUTO: 8.35 K/UL — SIGNIFICANT CHANGE UP (ref 3.8–10.5)

## 2020-12-01 PROCEDURE — 99284 EMERGENCY DEPT VISIT MOD MDM: CPT

## 2020-12-01 RX ORDER — CEPHALEXIN 500 MG
1 CAPSULE ORAL
Qty: 40 | Refills: 0
Start: 2020-12-01 | End: 2020-12-10

## 2020-12-01 RX ORDER — VANCOMYCIN HCL 1 G
1000 VIAL (EA) INTRAVENOUS ONCE
Refills: 0 | Status: COMPLETED | OUTPATIENT
Start: 2020-12-01 | End: 2020-12-01

## 2020-12-01 RX ORDER — AMPICILLIN SODIUM AND SULBACTAM SODIUM 250; 125 MG/ML; MG/ML
3 INJECTION, POWDER, FOR SUSPENSION INTRAMUSCULAR; INTRAVENOUS ONCE
Refills: 0 | Status: COMPLETED | OUTPATIENT
Start: 2020-12-01 | End: 2020-12-01

## 2020-12-01 RX ORDER — AZTREONAM 2 G
1 VIAL (EA) INJECTION
Qty: 20 | Refills: 0
Start: 2020-12-01 | End: 2020-12-10

## 2020-12-01 RX ADMIN — Medication 250 MILLIGRAM(S): at 13:38

## 2020-12-01 RX ADMIN — AMPICILLIN SODIUM AND SULBACTAM SODIUM 200 GRAM(S): 250; 125 INJECTION, POWDER, FOR SUSPENSION INTRAMUSCULAR; INTRAVENOUS at 13:03

## 2020-12-01 RX ADMIN — AMPICILLIN SODIUM AND SULBACTAM SODIUM 3 GRAM(S): 250; 125 INJECTION, POWDER, FOR SUSPENSION INTRAMUSCULAR; INTRAVENOUS at 13:38

## 2020-12-01 NOTE — ED PROVIDER NOTE - OBJECTIVE STATEMENT
39 y/o M with no PMHx presents to the ED for a chest wall abscess x1 week. Pt reports the abscess initially started as a pimple after shaving. The pimple gradually worsened in pain with increased redness. Pt states the pimple drained once (described as "bursting") but gradually became red around the border. He decided to come to the ED for evaluation. Pt denies fevers and chills.

## 2020-12-01 NOTE — ED PROVIDER NOTE - CLINICAL SUMMARY MEDICAL DECISION MAKING FREE TEXT BOX
41 y/o M presenting with an abscess to the chest wall. Exam is remarkable for a 1x1cm area of fluctuance, redness, and induration that extends to 4 cm. Plan for IV Abx, basic labs, and I&D procedure. Anticipate D/C with outpatient Abx.

## 2020-12-01 NOTE — ED PROVIDER NOTE - PATIENT PORTAL LINK FT
You can access the FollowMyHealth Patient Portal offered by [facility] by registering at the following website: [email or web address]. By joining [employer]’s FollowMyHealth portal, you will also be able to view your health information using other applications (apps) compatible with our system.

## 2020-12-01 NOTE — ED PROVIDER NOTE - SKIN, MLM
Singular small folliculitis / abscess: 1x1cm area of fluctuance, redness, and induration that extends to 4 cm.

## 2020-12-01 NOTE — ED ADULT NURSE NOTE - OBJECTIVE STATEMENT
Pt presents c/o 8/10 pain 2ndary to wound on chest wall.  Pt endorses wound began x1 week ago.  Elicits heat, pain and purulent drainage w/ nausea.  Pt denies fever/chills or vomiting.  Pt pending I&D and abx.

## 2020-12-01 NOTE — ED PROVIDER NOTE - NSFOLLOWUPINSTRUCTIONS_ED_ALL_ED_FT
Wound check in two days.   Take abx as prescribed    Abscess    An abscess is an infected area that contains a collection of pus and debris. It can occur in almost any part of the body and occurs when the tissue gets infection. Symptoms include a painful mass that is red, warm, tender that might break open and HAVE drainage. If your health care provider gave you antibiotics make sure to take the full course and do not stop even if feeling better.     SEEK IMMEDIATE MEDICAL CARE IF YOU HAVE ANY OF THE FOLLOWING SYMPTOMS: chills, fever, muscle aches, or red streaking from the area.

## 2020-12-02 ENCOUNTER — APPOINTMENT (OUTPATIENT)
Dept: PULMONOLOGY | Facility: CLINIC | Age: 40
End: 2020-12-02
Payer: MEDICAID

## 2020-12-02 PROCEDURE — 99214 OFFICE O/P EST MOD 30 MIN: CPT | Mod: 95

## 2020-12-02 NOTE — DISCUSSION/SUMMARY
[FreeTextEntry1] : 39yo with IBS, anxiety, IVDA s/p rehab (now clean for over a decade) following up regarding sleep paralysis and hallucinogen use. \par 1. Sleep paralysis is rare for him but it is highly worrisome when it happens. There are many possible etiologies including REM associated CAITLIN. However, given that this occurs so infrequently reassurance was provided that episodes will self resolve.\par 2. [place] is a hallucinogen that is usually taken during a seance at a retreat. Visual and auditory hallucinations are possible, can be extreme. Given his anxiety and difficult childhood I advised caution. Effects of hallucinogen on lung function is unknown. \par \par Follow up in Jan 2021, sooner if needed.

## 2020-12-02 NOTE — HISTORY OF PRESENT ILLNESS
[Medical Office: ([facility])___] : at the medical office located in  [Verbal consent obtained from patient] : the patient, [unfilled] [Former] : former [TextBox_4] : 40 yo with hx of IBS, anxiety, possible autism, hx of IVDA s/p rehab (now more than 10 yrs "clean") referred for evaluation of dyspnea and palpitations. He had suspected COVID infection in March; could not get tested; IgG negative in May. Had a significant amount of difficulty with exercise tolerance and is now scared to start exercising again. He also has a lot of psychosocial aspects that are driving a lot of anxiety including past drug abuse, social anxiety, poorly controlled ADHD, and PTSD. He has a lot of arrhythmias with no clear etiology; work up negative to date. Does have nightmares at night and very vivid dreams. No bed displacement. History of night terrors and sleep walking as a child. Is not sure if he snores. Nightmares are of the same theme but not the same exact recurrent dream. Has stopped Ritalin use for over 1 year because was developing tolerance. Seeking a psychiatrist. Had a CXR in March and was told it was "normal."\par \par 10/7/2020\par Have been communicating via [facility]/phone. Was started on ICS given persistent symptoms suggestive of mild asthma. Also had a few symptoms suggestive of VCD. Qvar is working but not completely. Has needed to use a few breakthrough doses of ICS. Used ROSALINDA and felt "off" for a few hours. Former smoker 3 packs per day x 1 year, 1 pack per day x 10 years. Has newly discovered bed bugs and mold in his current apartment.\par \par 11/25/2020\par Has been doing okay. Using Qvar and is perceiving benefit. Had HST and would like to discuss results. See prior [facility] notes.\par \par 12/2/2020\par Wanted to discuss sleep paralysis and going on an [place] retreat. Has  had a few episodes of sleep paralysis and this is bothersome to him. Of note, was in the ED x 24 hours for an abdominal abscess; on Bactrim and Cephalexin now; abd pain improved. [Awakes Unrefreshed] : awakes unrefreshed [Nonrestorative Sleep] : nonrestorative sleep [Home] : home [TextBox_100] : 10/2020 [TextBox_108] : 1.5 [TextBox_116] : 88

## 2020-12-03 ENCOUNTER — EMERGENCY (EMERGENCY)
Facility: HOSPITAL | Age: 40
LOS: 1 days | Discharge: HOME | End: 2020-12-03
Attending: EMERGENCY MEDICINE | Admitting: EMERGENCY MEDICINE
Payer: MEDICAID

## 2020-12-03 VITALS
TEMPERATURE: 98 F | RESPIRATION RATE: 16 BRPM | OXYGEN SATURATION: 96 % | DIASTOLIC BLOOD PRESSURE: 72 MMHG | SYSTOLIC BLOOD PRESSURE: 129 MMHG | HEART RATE: 91 BPM | HEIGHT: 75 IN | WEIGHT: 181 LBS

## 2020-12-03 DIAGNOSIS — L02.213 CUTANEOUS ABSCESS OF CHEST WALL: ICD-10-CM

## 2020-12-03 PROCEDURE — 99282 EMERGENCY DEPT VISIT SF MDM: CPT

## 2020-12-03 NOTE — ED PROVIDER NOTE - CPE EDP EYES NORM
Pt forgot to call us. She will check with the pharmacy to see if hers is one on the exchange. she will then call us with any concerns or questions. normal...

## 2020-12-03 NOTE — ED PROVIDER NOTE - OBJECTIVE STATEMENT
40 m for wound check - abscess to R chest wall , I and D- 3 days ago- compliant w keflex and bactrim  no f/c pain/redness decreasing  mild-moderate severity  no sig exac/allev factors

## 2020-12-03 NOTE — ED PROVIDER NOTE - NSFOLLOWUPINSTRUCTIONS_ED_ALL_ED_FT
ABSCESS - Ambulatory Care           Abscess    AMBULATORY CARE:    An abscess is an area under the skin where pus (infected fluid) collects. An abscess is often caused by bacteria, fungi or other germs that get into an open wound. You can get an abscess anywhere on your body.    Common signs and symptoms of an abscess: You may have a swollen mass that is red and painful. Pus may leak out of the mass. The pus will be white or yellow and may smell bad. You may have redness and pain days before the mass appears. You may have a fever and chills if the infection spreads.     Seek immediate care if:   •The area around your abscess becomes very painful, warm, or has red streaks.      •You have a fever and chills.      •Your heart is beating faster than usual.       •You feel faint or confused.      Contact your healthcare provider if:   •Your abscess gets bigger or does not get better.      •Your abscess returns.      •You have questions or concerns about your condition or care.      Treatment for an abscess: Your healthcare provider may need to make a cut in the abscess to allow the pus to drain. You may need surgery to remove your abscess. You may need any of the following:   •Antibiotics help treat a bacterial infection.       •Acetaminophen decreases pain and fever. It is available without a doctor's order. Ask how much to take and how often to take it. Follow directions. Acetaminophen can cause liver damage if not taken correctly.      •NSAIDs, such as ibuprofen, help decrease swelling, pain, and fever. This medicine is available with or without a doctor's order. NSAIDs can cause stomach bleeding or kidney problems in certain people. If you take blood thinner medicine, always ask your healthcare provider if NSAIDs are safe for you. Always read the medicine label and follow directions.      •Take your medicine as directed. Contact your healthcare provider if you think your medicine is not helping or if you have side effects. Tell him or her if you are allergic to any medicine. Keep a list of the medicines, vitamins, and herbs you take. Include the amounts, and when and why you take them. Bring the list or the pill bottles to follow-up visits. Carry your medicine list with you in case of an emergency.      Self-care:   •Apply a warm compress to your abscess. This will help it open and drain. Wet a washcloth in warm, but not hot, water. Apply the compress for 10 minutes. Repeat this 4 times each day. Do not press on an abscess or try to open it with a needle. You may push the bacteria deeper or into your blood.       •Do not share your clothes, towels, or sheets with anyone. This can spread the infection to others.       •Wash your hands often. This can help prevent the spread of germs. Use soap and water or an alcohol-based hand rub.       Care for your wound after it is drained:   •Care for your wound as directed. If your healthcare provider says it is okay, carefully remove the bandage and gauze packing. You may need to soak the gauze to get it out of your wound. Clean your wound and the area around it as directed. Dry the area and put on new, clean bandages. Change your bandages when they get wet or dirty.       •Ask your healthcare provider how to change the gauze in your wound. Keep track of how many pieces of gauze are placed inside the wound. Do not put too much packing in the wound. Do not pack the gauze too tightly in your wound.       Follow up with your healthcare provider in 1 to 3 days: You may need to have your packing removed or your bandage changed. Write down your questions so you remember to ask them during your visits.        © Copyright [employer] 2020           back to top                          © Copyright [employer] 2020

## 2020-12-03 NOTE — ED PROVIDER NOTE - PATIENT PORTAL LINK FT
You can access the FollowMyHealth Patient Portal offered by [facility] by registering at the following website: [email or web address]. By joining [employer]’s FollowMyHealth portal, you will also be able to view your health information using other applications (apps) compatible with our system.

## 2020-12-03 NOTE — ED PROVIDER NOTE - SKIN, MLM
Skin normal color for race, warm, dry and intact. healing abscess to R ant abd wall- 3 cm in diameter opening, healing well

## 2020-12-21 RX ORDER — NAPROXEN SODIUM 220 MG/1
220 TABLET, FILM COATED ORAL
Qty: 14 | Refills: 0 | Status: ACTIVE | COMMUNITY
Start: 2020-12-21 | End: 1900-01-01

## 2020-12-23 RX ORDER — NAPROXEN SODIUM 220 MG/1
220 CAPSULE, LIQUID FILLED ORAL
Qty: 30 | Refills: 0 | Status: ACTIVE | COMMUNITY
Start: 2020-05-07 | End: 1900-01-01

## 2021-01-11 ENCOUNTER — APPOINTMENT (OUTPATIENT)
Dept: FAMILY MEDICINE | Facility: CLINIC | Age: 41
End: 2021-01-11
Payer: MEDICAID

## 2021-01-11 DIAGNOSIS — L03.319 CELLULITIS OF TRUNK, UNSPECIFIED: ICD-10-CM

## 2021-01-11 PROCEDURE — 99213 OFFICE O/P EST LOW 20 MIN: CPT | Mod: 95

## 2021-01-11 RX ORDER — CEPHALEXIN 500 MG/1
500 TABLET ORAL
Qty: 14 | Refills: 0 | Status: DISCONTINUED | COMMUNITY
Start: 2021-01-11 | End: 2021-01-11

## 2021-01-11 RX ORDER — CEPHALEXIN 500 MG/1
500 CAPSULE ORAL TWICE DAILY
Qty: 14 | Refills: 0 | Status: ACTIVE | COMMUNITY
Start: 2021-01-11 | End: 1900-01-01

## 2021-01-11 RX ORDER — SULFAMETHOXAZOLE AND TRIMETHOPRIM 800; 160 MG/1; MG/1
800-160 TABLET ORAL
Qty: 14 | Refills: 0 | Status: ACTIVE | COMMUNITY
Start: 2021-01-11 | End: 1900-01-01

## 2021-01-11 NOTE — HISTORY OF PRESENT ILLNESS
[Home] : at home, [unfilled] , at the time of the visit. [Medical Office: ([facility])___] : at the medical office located in  [Verbal consent obtained from patient] : the patient, [unfilled] [FreeTextEntry8] : cc: cellulitis \par \par 41 yo m presents to discuss recent visit to [facility]. Was seen at [facility] early December for cellulitis, was give keflex and bactrim for a right sided growth 2" under breast crease. Mentioned failed treatment so he needed to have an abcess drained. Approx 1-2 days ago, on the left side, he now has one 4" under his breast crease laterally. Teardropped size, surrounding erythema, papular, red, central white spot, difficulty when laying on it. Mentioned has shaved his chest/ trunk, not new process for him, washes clippers with water. Interested in antibiotic treatment.

## 2021-01-11 NOTE — PHYSICAL EXAM
[de-identified] :  4" under his breast crease laterally. Teardropped size, surrounding erythema, papular, red, central white spot

## 2021-01-11 NOTE — PLAN
[FreeTextEntry1] : discussed hygiene \par sent bactrim and keflex to the pharmacy \par will fu with ed for abscess removal if no improvement

## 2021-01-12 ENCOUNTER — RX CHANGE (OUTPATIENT)
Age: 41
End: 2021-01-12

## 2021-01-19 ENCOUNTER — RX RENEWAL (OUTPATIENT)
Age: 41
End: 2021-01-19

## 2021-01-19 RX ORDER — BECLOMETHASONE DIPROPIONATE HFA 80 UG/1
80 AEROSOL, METERED RESPIRATORY (INHALATION)
Qty: 10.6 | Refills: 3 | Status: ACTIVE | COMMUNITY
Start: 2020-09-18 | End: 1900-01-01

## 2021-02-25 ENCOUNTER — NON-APPOINTMENT (OUTPATIENT)
Age: 41
End: 2021-02-25

## 2021-03-30 ENCOUNTER — TRANSCRIPTION ENCOUNTER (OUTPATIENT)
Age: 41
End: 2021-03-30

## 2021-03-30 ENCOUNTER — NON-APPOINTMENT (OUTPATIENT)
Age: 41
End: 2021-03-30

## 2021-04-05 ENCOUNTER — NON-APPOINTMENT (OUTPATIENT)
Age: 41
End: 2021-04-05

## 2021-04-12 ENCOUNTER — TRANSCRIPTION ENCOUNTER (OUTPATIENT)
Age: 41
End: 2021-04-12

## 2021-04-21 ENCOUNTER — NON-APPOINTMENT (OUTPATIENT)
Age: 41
End: 2021-04-21

## 2021-04-21 ENCOUNTER — APPOINTMENT (OUTPATIENT)
Dept: HEART AND VASCULAR | Facility: CLINIC | Age: 41
End: 2021-04-21
Payer: MEDICAID

## 2021-04-21 PROCEDURE — 99442: CPT

## 2021-04-27 ENCOUNTER — EMERGENCY (EMERGENCY)
Facility: HOSPITAL | Age: 41
LOS: 1 days | Discharge: HOME | End: 2021-04-27
Admitting: EMERGENCY MEDICINE
Payer: MEDICAID

## 2021-04-27 VITALS
OXYGEN SATURATION: 98 % | TEMPERATURE: 98 F | SYSTOLIC BLOOD PRESSURE: 126 MMHG | HEART RATE: 80 BPM | DIASTOLIC BLOOD PRESSURE: 84 MMHG | HEIGHT: 75 IN | RESPIRATION RATE: 18 BRPM

## 2021-04-27 DIAGNOSIS — Z20.822 CONTACT WITH AND (SUSPECTED) EXPOSURE TO COVID-19: ICD-10-CM

## 2021-04-27 PROCEDURE — 99282 EMERGENCY DEPT VISIT SF MDM: CPT

## 2021-04-27 NOTE — ED PROVIDER NOTE - NSFOLLOWUPINSTRUCTIONS_ED_ALL_ED_FT
THE COVID 19 TEST RESULTS  - results may take up to 2-3 days to become available   - if you have consented, you will receive your results electronically   -  you can check [employer] [facility] or call [phone] to discuss your results with our nursing staff    Please continue to self quarantine (home isolation) until your result is back and follow instructions accordingly  - positive: complete home isolation for a total of 14 days since day of testing and no more fever with feeling back to baseline   - negative: you will be able to stop home isolation but still practice standard precautions, similar to how you would manage a regular flu/cold.    Return to ER for any worsening symptoms, such as persistent fever >100.4F, shortness of breath, coughing up bloody sputum, chest pain, lethargy, and fainting    Please remember to wash your hands frequently (>20 seconds each time), avoid touching your face, and cover your cough/sneeze.  Always wear a mask when you are outside of your home and practice social distancing.    Only take Tylenol for fever/pain control and avoid NSAIDs (ibuprofen/Advil/Aleve/naproxen) due to potential increased risk of exacerbating COVID-19 infection

## 2021-04-27 NOTE — ED PROVIDER NOTE - PATIENT PORTAL LINK FT
You can access the FollowMyHealth Patient Portal offered by [facility] by registering at the following website: [email or web address]. By joining [employer]’s FollowMyHealth portal, you will also be able to view your health information using other applications (apps) compatible with our system.

## 2021-04-28 ENCOUNTER — APPOINTMENT (OUTPATIENT)
Dept: FAMILY MEDICINE | Facility: CLINIC | Age: 41
End: 2021-04-28
Payer: MEDICAID

## 2021-04-28 DIAGNOSIS — R06.02 SHORTNESS OF BREATH: ICD-10-CM

## 2021-04-28 DIAGNOSIS — R00.2 PALPITATIONS: ICD-10-CM

## 2021-04-28 DIAGNOSIS — Z87.898 PERSONAL HISTORY OF OTHER SPECIFIED CONDITIONS: ICD-10-CM

## 2021-04-28 DIAGNOSIS — M25.579 PAIN IN UNSPECIFIED ANKLE AND JOINTS OF UNSPECIFIED FOOT: ICD-10-CM

## 2021-04-28 DIAGNOSIS — Z71.89 OTHER SPECIFIED COUNSELING: ICD-10-CM

## 2021-04-28 LAB — SARS-COV-2 RNA SPEC QL NAA+PROBE: SIGNIFICANT CHANGE UP

## 2021-04-28 PROCEDURE — 99442: CPT

## 2021-04-29 PROBLEM — Z71.89 EDUCATED ABOUT COVID-19 VIRUS INFECTION: Status: ACTIVE | Noted: 2020-09-21

## 2021-04-29 PROBLEM — Z87.898 HISTORY OF DIZZINESS: Status: RESOLVED | Noted: 2020-05-20 | Resolved: 2021-04-29

## 2021-04-29 PROBLEM — R00.2 INTERMITTENT PALPITATIONS: Status: RESOLVED | Noted: 2020-05-07 | Resolved: 2021-04-29

## 2021-04-29 PROBLEM — Z87.898 HISTORY OF FATIGUE: Status: RESOLVED | Noted: 2020-05-20 | Resolved: 2021-04-29

## 2021-04-29 PROBLEM — M25.579 ANKLE PAIN: Status: ACTIVE | Noted: 2020-05-07

## 2021-04-29 PROBLEM — R06.02 SOB (SHORTNESS OF BREATH) ON EXERTION: Status: RESOLVED | Noted: 2020-05-07 | Resolved: 2021-04-29

## 2021-04-29 NOTE — HISTORY OF PRESENT ILLNESS
[Home] : at home, [unfilled] , at the time of the visit. [Medical Office: ([facility])___] : at the medical office located in  [Verbal consent obtained from patient] : the patient, [unfilled] [FreeTextEntry8] : cc: covid test \par \par 41 yo m presents to discussed recent covid contact. \par Mentioned fatigued and breathing issues. \par COVID negative. \par Mentioned came in contact virus a few weeks ago. \par Mentioned trouble with medications/ pain medications with his insurance. \par Going to [facility] next month, needs a CPE prior and a med rec that shows his meds so he will receive them in [facility].

## 2021-04-29 NOTE — PLAN
[FreeTextEntry1] : discussed importance of masking, social distancing, hygiene \par pt. covid neg. \par \par pt. mentioned having trouble with his insurance company, considering switching care\par in need of pain management doctor for medication prior to going back to [facility] \par afraid if he does not find a doctor, he will not get the medication he needs\par mentioned he is going to find a drug dealer if needed\par trying to coordinate care with other pain management providers

## 2021-05-21 ENCOUNTER — TRANSCRIPTION ENCOUNTER (OUTPATIENT)
Age: 41
End: 2021-05-21

## 2021-05-21 ENCOUNTER — NON-APPOINTMENT (OUTPATIENT)
Age: 41
End: 2021-05-21

## 2021-05-27 ENCOUNTER — APPOINTMENT (OUTPATIENT)
Dept: PULMONOLOGY | Facility: CLINIC | Age: 41
End: 2021-05-27
Payer: MEDICAID

## 2021-05-27 DIAGNOSIS — J45.909 UNSPECIFIED ASTHMA, UNCOMPLICATED: ICD-10-CM

## 2021-05-27 PROCEDURE — 99443: CPT

## 2021-05-28 PROBLEM — J45.909 ASTHMA: Status: ACTIVE | Noted: 2021-05-28

## 2021-05-28 NOTE — ASSESSMENT
[FreeTextEntry1] : 41yo with IBS, anxiety, IVDA s/p rehab (now clean for over a decade) following up for asthma. Was recently advised by me to stop ICS use because of voice hoarseness. His voice is recovering and he has not had any asthma exacerbations. Should continue to not use ICS. Reassurance provided that step down therapy is an accepted practice. Follow up as needed.

## 2021-05-28 NOTE — HISTORY OF PRESENT ILLNESS
[Medical Office: ([facility])___] : at the medical office located in  [Former] : former [Home] : home [TextBox_4] : 40 yo with hx of IBS, anxiety, possible autism, hx of IVDA s/p rehab (now more than 10 yrs "clean") referred for evaluation of dyspnea and palpitations. He had suspected COVID infection in March; could not get tested; IgG negative in May. Had a significant amount of difficulty with exercise tolerance and is now scared to start exercising again. He also has a lot of psychosocial aspects that are driving a lot of anxiety including past drug abuse, social anxiety, poorly controlled ADHD, and PTSD. He has a lot of arrhythmias with no clear etiology; work up negative to date. Does have nightmares at night and very vivid dreams. No bed displacement. History of night terrors and sleep walking as a child. Is not sure if he snores. Nightmares are of the same theme but not the same exact recurrent dream. Has stopped Ritalin use for over 1 year because was developing tolerance. Seeking a psychiatrist. Had a CXR in March and was told it was "normal."\par \par 10/7/2020\par Have been communicating via [facility]/phone. Was started on ICS given persistent symptoms suggestive of mild asthma. Also had a few symptoms suggestive of VCD. Qvar is working but not completely. Has needed to use a few breakthrough doses of ICS. Used ROSALINDA and felt "off" for a few hours. Former smoker 3 packs per day x 1 year, 1 pack per day x 10 years. Has newly discovered bed bugs and mold in his current apartment.\par \par 11/25/2020\par Has been doing okay. Using Qvar and is perceiving benefit. Had HST and would like to discuss results. See prior [facility] notes.\par \par 12/2/2020\par Wanted to discuss sleep paralysis and going on an [place] retreat. Has  had a few episodes of sleep paralysis and this is bothersome to him. Of note, was in the ED x 24 hours for an abdominal abscess; on Bactrim and Cephalexin now; abd pain improved.\par \par 5/27/2021\par Stopped Qvar and is doing well. Voice hoarseness improving and no change in respiratory symptoms.  [TextBox_100] : 10/2020 [TextBox_116] : 88 [TextBox_108] : 1.5

## 2021-08-20 ENCOUNTER — RX RENEWAL (OUTPATIENT)
Age: 41
End: 2021-08-20

## 2021-08-30 ENCOUNTER — TRANSCRIPTION ENCOUNTER (OUTPATIENT)
Age: 41
End: 2021-08-30

## 2021-09-27 ENCOUNTER — EMERGENCY (EMERGENCY)
Facility: HOSPITAL | Age: 41
LOS: 1 days | Discharge: HOME | End: 2021-09-27
Attending: EMERGENCY MEDICINE | Admitting: EMERGENCY MEDICINE
Payer: MEDICAID

## 2021-09-27 VITALS
RESPIRATION RATE: 16 BRPM | DIASTOLIC BLOOD PRESSURE: 86 MMHG | HEIGHT: 75 IN | SYSTOLIC BLOOD PRESSURE: 131 MMHG | TEMPERATURE: 98 F | OXYGEN SATURATION: 96 % | WEIGHT: 179.9 LBS | HEART RATE: 84 BPM

## 2021-09-27 VITALS
DIASTOLIC BLOOD PRESSURE: 65 MMHG | SYSTOLIC BLOOD PRESSURE: 112 MMHG | TEMPERATURE: 98 F | RESPIRATION RATE: 17 BRPM | OXYGEN SATURATION: 98 % | HEART RATE: 59 BPM

## 2021-09-27 DIAGNOSIS — R36.9 URETHRAL DISCHARGE, UNSPECIFIED: ICD-10-CM

## 2021-09-27 DIAGNOSIS — N48.89 OTHER SPECIFIED DISORDERS OF PENIS: ICD-10-CM

## 2021-09-27 LAB
APPEARANCE UR: CLEAR — SIGNIFICANT CHANGE UP
BILIRUB UR-MCNC: NEGATIVE — SIGNIFICANT CHANGE UP
COLOR SPEC: YELLOW — SIGNIFICANT CHANGE UP
DIFF PNL FLD: NEGATIVE — SIGNIFICANT CHANGE UP
GLUCOSE UR QL: NEGATIVE — SIGNIFICANT CHANGE UP
KETONES UR-MCNC: NEGATIVE — SIGNIFICANT CHANGE UP
LEUKOCYTE ESTERASE UR-ACNC: NEGATIVE — SIGNIFICANT CHANGE UP
NITRITE UR-MCNC: NEGATIVE — SIGNIFICANT CHANGE UP
PH UR: 5.5 — SIGNIFICANT CHANGE UP (ref 5–8)
PROT UR-MCNC: NEGATIVE MG/DL — SIGNIFICANT CHANGE UP
SP GR SPEC: >=1.03 — SIGNIFICANT CHANGE UP (ref 1–1.03)
UROBILINOGEN FLD QL: 0.2 E.U./DL — SIGNIFICANT CHANGE UP

## 2021-09-27 PROCEDURE — 99283 EMERGENCY DEPT VISIT LOW MDM: CPT

## 2021-09-27 NOTE — ED ADULT NURSE NOTE - CHIEF COMPLAINT QUOTE
Penile injury,  pt states it feels like he ripped something inside his penis, reports bloodly semen and sharp pain at the meatus x 2 days . Pt states he is still able to get an erection

## 2021-09-27 NOTE — ED PROVIDER NOTE - OBJECTIVE STATEMENT
39 yo male presents with complaint of pain to tip of penis at end of urethra post sexual intercourse approx 48 hours ago.  pain 39 yo male presents with complaint of pain to tip of penis at end of urethra post sexual intercourse approx 48 hours ago.  pain is progressively getting better    no fever no dc no bleeding no swelling

## 2021-09-27 NOTE — ED PROVIDER NOTE - GENITOURINARY, MLM
No discharge, lesions. no signs of trauma - no lacerations ecchymosis no swelling no erythema no testicular swelling or tenderness

## 2021-09-27 NOTE — ED PROVIDER NOTE - PATIENT PORTAL LINK FT
You can access the FollowMyHealth Patient Portal offered by [facility] by registering at the following website: [email or web address]. By joining [employer]’s FollowMyHealth portal, you will also be able to view your health information using other applications (apps) compatible with our system.

## 2021-09-27 NOTE — ED ADULT TRIAGE NOTE - CHIEF COMPLAINT QUOTE
Penile injury pt states it feels like he ripped something inside his penis, pt report blood semen and sharp pain at the meatus x 2 days . Pt states he is still able to get an erection Penile injury,  pt states it feels like he ripped something inside his penis, reports bloodly semen and sharp pain at the meatus x 2 days . Pt states he is still able to get an erection

## 2021-09-27 NOTE — ED ADULT NURSE REASSESSMENT NOTE - NS ED NURSE REASSESS COMMENT FT1
pt care handed over to myself from rhiannon houston, introduced self to patient, moved to bed 15 , urine sent to lab, given gown to change into, dr moore aware

## 2021-09-27 NOTE — ED PROVIDER NOTE - NSFOLLOWUPINSTRUCTIONS_ED_ALL_ED_FT
Nurys METZGER follow up with dr Nurys METZGER -urology in the next 1-2 days     stay well hydrated     return to ER if symptoms worsen or for any other concern

## 2021-09-28 LAB
C TRACH RRNA SPEC QL NAA+PROBE: SIGNIFICANT CHANGE UP
N GONORRHOEA RRNA SPEC QL NAA+PROBE: SIGNIFICANT CHANGE UP
SPECIMEN SOURCE: SIGNIFICANT CHANGE UP
SPECIMEN SOURCE: SIGNIFICANT CHANGE UP

## 2021-12-11 ENCOUNTER — EMERGENCY (EMERGENCY)
Facility: HOSPITAL | Age: 41
LOS: 1 days | Discharge: HOME | End: 2021-12-11
Attending: EMERGENCY MEDICINE | Admitting: EMERGENCY MEDICINE
Payer: MEDICAID

## 2021-12-11 VITALS
HEART RATE: 77 BPM | HEIGHT: 75 IN | DIASTOLIC BLOOD PRESSURE: 60 MMHG | TEMPERATURE: 99 F | OXYGEN SATURATION: 96 % | WEIGHT: 179.9 LBS | RESPIRATION RATE: 16 BRPM | SYSTOLIC BLOOD PRESSURE: 96 MMHG

## 2021-12-11 DIAGNOSIS — J45.909 UNSPECIFIED ASTHMA, UNCOMPLICATED: ICD-10-CM

## 2021-12-11 DIAGNOSIS — N42.0 CALCULUS OF PROSTATE: ICD-10-CM

## 2021-12-11 DIAGNOSIS — R30.0 DYSURIA: ICD-10-CM

## 2021-12-11 LAB
APPEARANCE UR: CLEAR — SIGNIFICANT CHANGE UP
BILIRUB UR-MCNC: NEGATIVE — SIGNIFICANT CHANGE UP
COLOR SPEC: YELLOW — SIGNIFICANT CHANGE UP
DIFF PNL FLD: NEGATIVE — SIGNIFICANT CHANGE UP
GLUCOSE UR QL: NEGATIVE — SIGNIFICANT CHANGE UP
KETONES UR-MCNC: NEGATIVE — SIGNIFICANT CHANGE UP
LEUKOCYTE ESTERASE UR-ACNC: NEGATIVE — SIGNIFICANT CHANGE UP
NITRITE UR-MCNC: NEGATIVE — SIGNIFICANT CHANGE UP
PH UR: 6 — SIGNIFICANT CHANGE UP (ref 5–8)
PROT UR-MCNC: NEGATIVE MG/DL — SIGNIFICANT CHANGE UP
SP GR SPEC: >=1.03 — SIGNIFICANT CHANGE UP (ref 1–1.03)
UROBILINOGEN FLD QL: 1 E.U./DL — SIGNIFICANT CHANGE UP

## 2021-12-11 PROCEDURE — 99284 EMERGENCY DEPT VISIT MOD MDM: CPT

## 2021-12-11 PROCEDURE — 74176 CT ABD & PELVIS W/O CONTRAST: CPT | Mod: 26

## 2021-12-11 RX ORDER — TAMSULOSIN HYDROCHLORIDE 0.4 MG/1
0.4 CAPSULE ORAL ONCE
Refills: 0 | Status: COMPLETED | OUTPATIENT
Start: 2021-12-11 | End: 2021-12-11

## 2021-12-11 RX ORDER — PHENAZOPYRIDINE HCL 100 MG
200 TABLET ORAL ONCE
Refills: 0 | Status: COMPLETED | OUTPATIENT
Start: 2021-12-11 | End: 2021-12-11

## 2021-12-11 RX ORDER — TRAMADOL HYDROCHLORIDE 50 MG/1
25 TABLET ORAL ONCE
Refills: 0 | Status: DISCONTINUED | OUTPATIENT
Start: 2021-12-11 | End: 2021-12-11

## 2021-12-11 RX ORDER — OXYCODONE AND ACETAMINOPHEN 5; 325 MG/1; MG/1
2 TABLET ORAL ONCE
Refills: 0 | Status: DISCONTINUED | OUTPATIENT
Start: 2021-12-11 | End: 2021-12-11

## 2021-12-11 RX ORDER — OXYCODONE HYDROCHLORIDE 5 MG/1
1 TABLET ORAL
Qty: 18 | Refills: 0
Start: 2021-12-11 | End: 2021-12-13

## 2021-12-11 RX ORDER — TAMSULOSIN HYDROCHLORIDE 0.4 MG/1
1 CAPSULE ORAL
Qty: 14 | Refills: 0
Start: 2021-12-11 | End: 2021-12-15

## 2021-12-11 RX ADMIN — OXYCODONE AND ACETAMINOPHEN 2 TABLET(S): 5; 325 TABLET ORAL at 22:25

## 2021-12-11 RX ADMIN — Medication 200 MILLIGRAM(S): at 20:04

## 2021-12-11 NOTE — ED PROVIDER NOTE - CARE PROVIDER_API CALL
Swati Cesar)  Urology  [address] [place] [address]  Phone: [phone]  Fax: [phone]  Follow Up Time:

## 2021-12-11 NOTE — ED ADULT NURSE NOTE - ED STAT RN HANDOFF DETAILS
received verbal report from rhiannon freedman at change of shift at 8pm; patient waiting for eval and dispo; given pain meds as per md order; given late as RN busy with critical patients; patient complained pain meds were late and cursed at MD; medicated as soon as RN could; will contineu to monitor

## 2021-12-11 NOTE — ED ADULT NURSE NOTE - CHIEF COMPLAINT QUOTE
"I feel like pins and needles are coming out of penis" x 2 months and difficulty urinating-seen here 2.5months ago for the same issue, requesting pain meds. Declines std/sti and hiv testing

## 2021-12-11 NOTE — ED PROVIDER NOTE - OBJECTIVE STATEMENT
42 yo male presents c/o stinging/pain tip of penis x 2 days similar to episode 2.5 months ago. Pt states he does not know what the cause was 2.5 months ago. Denies penile dc/rash/lesions. + dysuria.

## 2021-12-11 NOTE — ED PROVIDER NOTE - PATIENT PORTAL LINK FT
You can access the FollowMyHealth Patient Portal offered by [facility] by registering at the following website: [email or web address]. By joining [employer]’s FollowMyHealth portal, you will also be able to view your health information using other applications (apps) compatible with our system.

## 2021-12-11 NOTE — ED ADULT TRIAGE NOTE - CHIEF COMPLAINT QUOTE
"I feel like pins and needles are coming out of penis" x 2 months and difficulty urinating-seen here 2.5months ago for the same issue, requesting pain meds "I feel like pins and needles are coming out of penis" x 2 months and difficulty urinating-seen here 2.5months ago for the same issue, requesting pain meds. Declines std/sti and hiv testing

## 2021-12-11 NOTE — ED ADULT NURSE NOTE - NSIMPLEMENTINTERV_GEN_ALL_ED
Implemented All Universal Safety Interventions:  [place] to call system. Call bell, personal items and telephone within reach. Instruct patient to call for assistance. Room bathroom lighting operational. Non-slip footwear when patient is off stretcher. Physically safe environment: no spills, clutter or unnecessary equipment. Stretcher in lowest position, wheels locked, appropriate side rails in place.

## 2021-12-11 NOTE — ED ADULT NURSE NOTE - NEURO ASSESSMENT
Alert and oriented to person, place, time/situation. normal mood and affect. no apparent risk to self or others. - - -

## 2021-12-11 NOTE — ED ADULT NURSE NOTE - OBJECTIVE STATEMENT
pt a&ox3 c/o "pins and needles feeling like they're coming out of penis" x2.5 months and dysuria x3 days. was seen here recently for same. reports unprotected sexual contact with girlfriend. states he was unable to see urologist. PMH asthma, neck surgery. will continue to monitor.

## 2021-12-12 VITALS
HEART RATE: 63 BPM | DIASTOLIC BLOOD PRESSURE: 68 MMHG | OXYGEN SATURATION: 98 % | RESPIRATION RATE: 18 BRPM | SYSTOLIC BLOOD PRESSURE: 103 MMHG | TEMPERATURE: 98 F

## 2021-12-12 LAB
CULTURE RESULTS: NO GROWTH — SIGNIFICANT CHANGE UP
SPECIMEN SOURCE: SIGNIFICANT CHANGE UP

## 2021-12-12 RX ADMIN — TAMSULOSIN HYDROCHLORIDE 0.4 MILLIGRAM(S): 0.4 CAPSULE ORAL at 00:06

## 2021-12-12 RX ADMIN — OXYCODONE AND ACETAMINOPHEN 2 TABLET(S): 5; 325 TABLET ORAL at 00:06

## 2021-12-13 LAB
C TRACH RRNA SPEC QL NAA+PROBE: SIGNIFICANT CHANGE UP
N GONORRHOEA RRNA SPEC QL NAA+PROBE: SIGNIFICANT CHANGE UP
SPECIMEN SOURCE: SIGNIFICANT CHANGE UP

## 2022-07-27 ENCOUNTER — EMERGENCY (EMERGENCY)
Facility: HOSPITAL | Age: 42
LOS: 1 days | Discharge: HOME | End: 2022-07-27
Attending: EMERGENCY MEDICINE | Admitting: EMERGENCY MEDICINE

## 2022-07-27 VITALS
OXYGEN SATURATION: 96 % | SYSTOLIC BLOOD PRESSURE: 114 MMHG | RESPIRATION RATE: 18 BRPM | HEIGHT: 75 IN | DIASTOLIC BLOOD PRESSURE: 67 MMHG | HEART RATE: 74 BPM | TEMPERATURE: 98 F

## 2022-07-27 DIAGNOSIS — Z11.3 ENCOUNTER FOR SCREENING FOR INFECTIONS WITH A PREDOMINANTLY SEXUAL MODE OF TRANSMISSION: ICD-10-CM

## 2022-07-27 DIAGNOSIS — B35.3 TINEA PEDIS: ICD-10-CM

## 2022-07-27 PROBLEM — J45.909 UNSPECIFIED ASTHMA, UNCOMPLICATED: Chronic | Status: ACTIVE | Noted: 2021-12-11

## 2022-07-27 PROBLEM — Z78.9 OTHER SPECIFIED HEALTH STATUS: Chronic | Status: ACTIVE | Noted: 2020-12-01

## 2022-07-27 LAB — HIV 1 & 2 AB SERPL IA.RAPID: SIGNIFICANT CHANGE UP

## 2022-07-27 PROCEDURE — 99283 EMERGENCY DEPT VISIT LOW MDM: CPT

## 2022-07-27 RX ORDER — FLUCONAZOLE 150 MG/1
3 TABLET ORAL
Qty: 18 | Refills: 0
Start: 2022-07-27 | End: 2022-09-06

## 2022-07-27 NOTE — ED PROVIDER NOTE - CLINICAL SUMMARY MEDICAL DECISION MAKING FREE TEXT BOX
Patient presenting with request for STI screening- also has tinea pedis and onychomycosis. Will rx Diflucan for feet. Rec seeing a podiatrist for toenail or derm, Patient refers to come back for +ve test results. Patient presenting with request for STI screening- also has tinea pedis and onychomycosis. Will rx Diflucan for feet x 6 weeks. Rec seeing a podiatrist for toenail or derm, Patient refers to come back for +ve test results.

## 2022-07-27 NOTE — ED PROVIDER NOTE - CROS ED GU ALL NEG
I have personally seen and examined this patient.  I have fully participated in the care of this patient. I have reviewed all pertinent clinical information, including history, physical exam, plan and the Resident’s note and agree except as noted.
- - -

## 2022-07-27 NOTE — ED PROVIDER NOTE - CARE PLAN
1 Principal Discharge DX:	Screening for STD (sexually transmitted disease)  Secondary Diagnosis:	TP (tinea pedis)

## 2022-07-27 NOTE — ED ADULT NURSE NOTE - OBJECTIVE STATEMENT
Pt presents to ED complaining of sti and std testing primarily with multiple other chronic complaints.

## 2022-07-27 NOTE — ED ADULT TRIAGE NOTE - CHIEF COMPLAINT QUOTE
pt requesting sti testing, denies symptoms. also requesting evaluation of foot fungus he has had for the past four years.

## 2022-07-27 NOTE — ED PROVIDER NOTE - NSFOLLOWUPINSTRUCTIONS_ED_ALL_ED_FT
You will get a phone call with positive test results.    They will also appear ion the patient portal.     Please try to wear wicking socks. [employer] makes some good ones.     Use the Diflucan once a week for the next 8 weeks. Please see a podiatrist or dermatologist for your toenail fungus.

## 2022-07-27 NOTE — ED PROVIDER NOTE - NSFOLLOWUPCLINICS_GEN_ALL_ED_FT
[facility]  Podiatry  [address] [place] [address]  Phone: [phone]  Fax:

## 2022-07-27 NOTE — ED PROVIDER NOTE - OBJECTIVE STATEMENT
41 M presenting with request for STI screening. He had a female sex partner 3.5 weeks ago and she called him and told him that she gave him STIs- she was non-specific and apparently mentioned Herpes. He has no sx.. He is only sexually active with women and wants to als get screened as things are getting more serious with another woman. He is currently on probiotics trying to rebuild his gut mitch after taking abx and prefers to wait until his test results before taking abx.     He also has tinea pedis and toenail fungus. he has been using a cream rx'd at [facility] that didn't help. He has a hx of elen-oral cold sores since childhood. Never had genital lesions.

## 2022-07-27 NOTE — ED PROVIDER NOTE - PATIENT PORTAL LINK FT
You can access the FollowMyHealth Patient Portal offered by [facility] by registering at the following website: [email or web address]. By joining [employer]’s FollowMyHealth portal, you will also be able to view your health information using other applications (apps) compatible with our system.

## 2022-07-28 LAB
C TRACH RRNA SPEC QL NAA+PROBE: SIGNIFICANT CHANGE UP
HSV1 IGG SER-ACNC: 31.6 INDEX — HIGH
HSV1 IGG SERPL QL IA: POSITIVE
HSV2 IGG FLD-ACNC: 0.1 INDEX — SIGNIFICANT CHANGE UP
HSV2 IGG SERPL QL IA: NEGATIVE — SIGNIFICANT CHANGE UP
N GONORRHOEA RRNA SPEC QL NAA+PROBE: SIGNIFICANT CHANGE UP
SPECIMEN SOURCE: SIGNIFICANT CHANGE UP
T PALLIDUM AB TITR SER: NEGATIVE — SIGNIFICANT CHANGE UP

## 2022-08-05 LAB
HSV1 AB FLD QL: NEGATIVE — SIGNIFICANT CHANGE UP
HSV2 AB FLD-ACNC: NEGATIVE — SIGNIFICANT CHANGE UP

## 2022-08-16 NOTE — ED ADULT TRIAGE NOTE - HEART RATE (BEATS/MIN)
Reduce tresiba to 60 U at bed time  Take new medication farxiga every morning   Keep yourself well hydrated  Good genital hygiene  Call Dr Leonel Briones if having any urinary symptoms or hypoglycemic symptoms and local genital discomfort  Also notify your urologist about new medication
80

## 2022-11-06 ENCOUNTER — RX RENEWAL (OUTPATIENT)
Age: 42
End: 2022-11-06

## 2022-11-09 ENCOUNTER — RX RENEWAL (OUTPATIENT)
Age: 42
End: 2022-11-09

## 2023-01-17 ENCOUNTER — RX RENEWAL (OUTPATIENT)
Age: 43
End: 2023-01-17

## 2023-11-13 RX ORDER — ALBUTEROL SULFATE 90 UG/1
108 (90 BASE) INHALANT RESPIRATORY (INHALATION)
Qty: 10 | Refills: 0 | Status: ACTIVE | COMMUNITY
Start: 2020-05-12 | End: 1900-01-01

## 2024-06-13 ENCOUNTER — EMERGENCY (EMERGENCY)
Facility: HOSPITAL | Age: 44
LOS: 1 days | Discharge: HOME | End: 2024-06-13
Admitting: EMERGENCY MEDICINE
Payer: MEDICAID

## 2024-06-13 VITALS
HEART RATE: 85 BPM | SYSTOLIC BLOOD PRESSURE: 117 MMHG | WEIGHT: 182.1 LBS | TEMPERATURE: 98 F | RESPIRATION RATE: 16 BRPM | DIASTOLIC BLOOD PRESSURE: 70 MMHG | HEIGHT: 73 IN | OXYGEN SATURATION: 97 %

## 2024-06-13 DIAGNOSIS — R20.8 OTHER DISTURBANCES OF SKIN SENSATION: ICD-10-CM

## 2024-06-13 PROCEDURE — 99283 EMERGENCY DEPT VISIT LOW MDM: CPT

## 2024-06-13 NOTE — ED PROVIDER NOTE - PHYSICAL EXAMINATION
General: well developed, well nourished, no distress  Eyes: no scleral injection  Neck: non-tender, full range of motion, supple.   Respiratory: unlabored breathing  Cardiovascular: no central cyanosis  Musculoskeletal: normal gait.   Rectum: normal rectal tone. No hemorrhoids, abscess or fissure noted.   Extremities: normal range of motion, non-tender.  Neurologic: alert, oriented to person, oriented to place, oriented to time.    Skin: normal color.  Negative For: rash  Psychiatric: normal affect, normal insight, normal concentration

## 2024-06-13 NOTE — ED PROVIDER NOTE - OBJECTIVE STATEMENT
44 yo M, no pmh, presents to this ED for rectal burning 42 yo M, no pmh, presents to this ED for rectal burning x 2 weeks, however worse over the past 2 days. Pt states that he had sexual intercouse with a woman 2 months ago and contracted molluscum from her, and has been going to the dermatologist regularly. However 2 days ago he noticed a burning sensation via rectum. Pt states that he tried to look in the mirror however "it freaked me out" and  "I've never seen a butthole before". Denies placing anything in the rectum. Denies any recent sexual intercouse. States that he has been tested for STDs and everything came back negative.

## 2024-06-13 NOTE — ED PROVIDER NOTE - PATIENT PORTAL LINK FT
You can access the FollowMyHealth Patient Portal offered by [facility] by registering at the following website: [email or web address]. By joining [facility]’s FollowMyHealth portal, you will also be able to view your health information using other applications (apps) compatible with our system.

## 2024-06-13 NOTE — ED PROVIDER NOTE - NS ED ATTENDING NAME FT
2018  EMPLOYEE INFORMATION: EMPLOYER INFORMATION:   NAME: Ysabel Tom [employer] ( ALL SITES)   : 1982 [phone]   DATE OF INJURY/EVENT: 2018           [facility]   Treating Provider: Merced Johnson PA-C  Time In:  8:27 AM Time Out:  8:50 AM      DIAGNOSIS:   1. Strain of neck muscle, subsequent encounter    2. Left arm pain      STATUS: This injury is determined to be WORK RELATED.    RETURN TO WORK:  Employee may return to work with restrictions.     Return Date: 2018            RESTRICTIONS:   Restrictions are to be followed at work and at home.  Restrictions are in effect until next follow-up visit.  No lifting or carrying > 10 lbs with left arm. Should alternate between job tasks as tolerated.    TREATMENT PLAN:  Medications for this injury/condition: OTC ibuprofen as needed. May use Flexeril at night as needed for muscle spasm  Referral/Consult: continue with PT  Diagnostic Testing:   None         Instructions: try alternating between job tasks at work such as  and pulling. Continue with home exercises per PT    NEXT RETURN VISIT: 18 at 9:00am   Thank you for the privilege of providing medical care for this injury/condition.  If there are any questions, please call the occupational health clinic at Dept: [phone].    Electronically signed on 2018 at 8:50 AM by:   Merced Johnson PA-C   [facility]    The physician below agrees with the plan and restrictions placed on the patient by the provider above.  Pablo Caceres MD    
mag [employer]els

## 2024-06-13 NOTE — ED PROVIDER NOTE - CLINICAL SUMMARY MEDICAL DECISION MAKING FREE TEXT BOX
44 yo M presents to this ED for rectal burning  VSS, and physical exam is unremarkable.  However offered pt a CT and labs, and pt states that he cannot stay for long as he has a business meetting. States that he would like to come back for further work up  Pt would like to leave. Risks explained to pt. Pt understands.  All results reviewed with pt. Pt understands and agrees with plan. Agreed to follow up with pcp in 2-3 days.

## 2024-06-13 NOTE — ED ADULT TRIAGE NOTE - CHIEF COMPLAINT QUOTE
Pt reports since Sunday 6/9 he has had burning/stinging to rectum. No drainage. pt reports there may be a blister, he attempted to look but isn't sure.

## 2024-10-23 ENCOUNTER — EMERGENCY (EMERGENCY)
Age: 44
LOS: 1 days | Discharge: HOME | End: 2024-10-23
Admitting: EMERGENCY MEDICINE
Payer: MEDICAID

## 2024-10-23 VITALS
HEART RATE: 67 BPM | TEMPERATURE: 98 F | SYSTOLIC BLOOD PRESSURE: 117 MMHG | OXYGEN SATURATION: 96 % | RESPIRATION RATE: 16 BRPM | DIASTOLIC BLOOD PRESSURE: 72 MMHG | WEIGHT: 182.98 LBS | HEIGHT: 73 IN

## 2024-10-23 PROCEDURE — 99284 EMERGENCY DEPT VISIT MOD MDM: CPT

## 2024-10-23 PROCEDURE — 93971 EXTREMITY STUDY: CPT | Mod: 26,LT

## 2024-10-25 DIAGNOSIS — I80.8 PHLEBITIS AND THROMBOPHLEBITIS OF OTHER SITES: ICD-10-CM

## 2024-10-25 DIAGNOSIS — Z88.8 ALLERGY STATUS TO OTHER DRUGS, MEDICAMENTS AND BIOLOGICAL SUBSTANCES: ICD-10-CM

## 2024-10-25 DIAGNOSIS — M79.89 OTHER SPECIFIED SOFT TISSUE DISORDERS: ICD-10-CM

## 2025-02-12 ENCOUNTER — EMERGENCY (EMERGENCY)
Age: 45
LOS: 1 days | Discharge: HOME | End: 2025-02-12
Admitting: EMERGENCY MEDICINE
Payer: MEDICAID

## 2025-02-12 VITALS
DIASTOLIC BLOOD PRESSURE: 72 MMHG | SYSTOLIC BLOOD PRESSURE: 105 MMHG | WEIGHT: 188.94 LBS | HEART RATE: 79 BPM | OXYGEN SATURATION: 100 % | TEMPERATURE: 98 F | RESPIRATION RATE: 18 BRPM

## 2025-02-12 PROCEDURE — 99284 EMERGENCY DEPT VISIT MOD MDM: CPT

## 2025-02-14 DIAGNOSIS — Z87.438 PERSONAL HISTORY OF OTHER DISEASES OF MALE GENITAL ORGANS: ICD-10-CM

## 2025-02-14 DIAGNOSIS — N48.89 OTHER SPECIFIED DISORDERS OF PENIS: ICD-10-CM

## 2025-02-14 DIAGNOSIS — Z91.09 OTHER ALLERGY STATUS, OTHER THAN TO DRUGS AND BIOLOGICAL SUBSTANCES: ICD-10-CM

## 2025-02-21 ENCOUNTER — NON-APPOINTMENT (OUTPATIENT)
Age: 45
End: 2025-02-21

## 2025-02-24 ENCOUNTER — APPOINTMENT (OUTPATIENT)
Dept: UROLOGY | Facility: CLINIC | Age: 45
End: 2025-02-24

## 2025-03-26 ENCOUNTER — NON-APPOINTMENT (OUTPATIENT)
Age: 45
End: 2025-03-26

## 2025-03-26 ENCOUNTER — APPOINTMENT (OUTPATIENT)
Dept: PHYSICAL MEDICINE AND REHAB | Facility: CLINIC | Age: 45
End: 2025-03-26
Payer: MEDICAID

## 2025-03-26 VITALS
WEIGHT: 176 LBS | BODY MASS INDEX: 23.33 KG/M2 | DIASTOLIC BLOOD PRESSURE: 65 MMHG | HEART RATE: 65 BPM | RESPIRATION RATE: 18 BRPM | SYSTOLIC BLOOD PRESSURE: 109 MMHG | HEIGHT: 73 IN

## 2025-03-26 VITALS
HEIGHT: 73 IN | HEART RATE: 65 BPM | SYSTOLIC BLOOD PRESSURE: 109 MMHG | DIASTOLIC BLOOD PRESSURE: 65 MMHG | RESPIRATION RATE: 18 BRPM

## 2025-03-26 DIAGNOSIS — G89.29 CERVICALGIA: ICD-10-CM

## 2025-03-26 DIAGNOSIS — R52 PAIN, UNSPECIFIED: ICD-10-CM

## 2025-03-26 DIAGNOSIS — Z72.820 SLEEP DEPRIVATION: ICD-10-CM

## 2025-03-26 DIAGNOSIS — J45.20 MILD INTERMITTENT ASTHMA, UNCOMPLICATED: ICD-10-CM

## 2025-03-26 DIAGNOSIS — M54.2 CERVICALGIA: ICD-10-CM

## 2025-03-26 DIAGNOSIS — Z87.39 PERSONAL HISTORY OF OTHER DISEASES OF THE MUSCULOSKELETAL SYSTEM AND CONNECTIVE TISSUE: ICD-10-CM

## 2025-03-26 DIAGNOSIS — G89.4 CHRONIC PAIN SYNDROME: ICD-10-CM

## 2025-03-26 DIAGNOSIS — M96.1 POSTLAMINECTOMY SYNDROME, NOT ELSEWHERE CLASSIFIED: ICD-10-CM

## 2025-03-26 PROCEDURE — 99205 OFFICE O/P NEW HI 60 MIN: CPT

## 2025-03-26 PROCEDURE — G2211 COMPLEX E/M VISIT ADD ON: CPT | Mod: NC

## 2025-03-26 RX ORDER — NAPROXEN 500 MG/1
500 TABLET ORAL
Qty: 60 | Refills: 0 | Status: ACTIVE | COMMUNITY
Start: 2025-03-26 | End: 2025-04-25

## 2025-05-08 ENCOUNTER — APPOINTMENT (OUTPATIENT)
Dept: PHYSICAL MEDICINE AND REHAB | Facility: CLINIC | Age: 45
End: 2025-05-08

## 2025-05-08 VITALS
SYSTOLIC BLOOD PRESSURE: 116 MMHG | HEART RATE: 93 BPM | WEIGHT: 182 LBS | BODY MASS INDEX: 24.12 KG/M2 | RESPIRATION RATE: 18 BRPM | DIASTOLIC BLOOD PRESSURE: 60 MMHG | HEIGHT: 73 IN

## 2025-05-08 DIAGNOSIS — M96.1 POSTLAMINECTOMY SYNDROME, NOT ELSEWHERE CLASSIFIED: ICD-10-CM

## 2025-05-08 PROCEDURE — 99214 OFFICE O/P EST MOD 30 MIN: CPT

## 2025-05-16 ENCOUNTER — APPOINTMENT (OUTPATIENT)
Dept: PHYSICAL MEDICINE AND REHAB | Facility: CLINIC | Age: 45
End: 2025-05-16

## 2025-05-16 VITALS
WEIGHT: 182 LBS | RESPIRATION RATE: 18 BRPM | HEIGHT: 73 IN | BODY MASS INDEX: 24.12 KG/M2 | HEART RATE: 80 BPM | SYSTOLIC BLOOD PRESSURE: 99 MMHG | DIASTOLIC BLOOD PRESSURE: 61 MMHG

## 2025-05-16 DIAGNOSIS — G89.29 CERVICALGIA: ICD-10-CM

## 2025-05-16 DIAGNOSIS — G89.4 CHRONIC PAIN SYNDROME: ICD-10-CM

## 2025-05-16 DIAGNOSIS — R52 PAIN, UNSPECIFIED: ICD-10-CM

## 2025-05-16 DIAGNOSIS — M54.2 CERVICALGIA: ICD-10-CM

## 2025-05-16 PROCEDURE — 95912 NRV CNDJ TEST 11-12 STUDIES: CPT

## 2025-05-16 PROCEDURE — 95886 MUSC TEST DONE W/N TEST COMP: CPT
